# Patient Record
Sex: FEMALE | Race: WHITE | NOT HISPANIC OR LATINO | Employment: OTHER | URBAN - METROPOLITAN AREA
[De-identification: names, ages, dates, MRNs, and addresses within clinical notes are randomized per-mention and may not be internally consistent; named-entity substitution may affect disease eponyms.]

---
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
## 2018-02-08 ENCOUNTER — TELEPHONE (OUTPATIENT)
Dept: INTERNAL MEDICINE | Facility: CLINIC | Age: 18
End: 2018-02-08

## 2018-02-08 NOTE — TELEPHONE ENCOUNTER
----- Message from Amanda Tam sent at 2/8/2018 12:01 PM EST -----  Pt went to urgent care in [place] and they told her she had the flu last week on the 3rd, now she went to another place this morning and they told her she had a sinus infection, but pt has sores in her mouth, hard to talk, unable to eat, hasn't used bathroom in a few days.   Can we work pt in to evaluate her, or what would you suggest pt do?   Father--Quinn Singh   P. [phone]

## 2018-02-08 NOTE — TELEPHONE ENCOUNTER
We are completely booked today and patient will have to make a follow-up appointment or proceed to the urgent treatment Center to be evaluated.

## 2018-02-09 ENCOUNTER — OFFICE VISIT (OUTPATIENT)
Dept: INTERNAL MEDICINE | Facility: CLINIC | Age: 18
End: 2018-02-09

## 2018-02-09 VITALS
HEART RATE: 96 BPM | SYSTOLIC BLOOD PRESSURE: 90 MMHG | BODY MASS INDEX: 25.56 KG/M2 | TEMPERATURE: 97.2 F | RESPIRATION RATE: 20 BRPM | WEIGHT: 135.25 LBS | DIASTOLIC BLOOD PRESSURE: 62 MMHG

## 2018-02-09 DIAGNOSIS — B34.9 VIRAL SYNDROME: ICD-10-CM

## 2018-02-09 DIAGNOSIS — B08.5 HERPANGINA: Primary | ICD-10-CM

## 2018-02-09 LAB
ALBUMIN SERPL-MCNC: 4.6 G/DL (ref 3.2–4.8)
ALBUMIN/GLOB SERPL: 1.8 G/DL (ref 1.5–2.5)
ALP SERPL-CCNC: 77 U/L (ref 25–100)
ALT SERPL W P-5'-P-CCNC: 10 U/L (ref 7–40)
AMYLASE SERPL-CCNC: 36 U/L (ref 30–118)
ANION GAP SERPL CALCULATED.3IONS-SCNC: 11 MMOL/L (ref 3–11)
AST SERPL-CCNC: 13 U/L (ref 0–33)
BILIRUB SERPL-MCNC: 0.7 MG/DL (ref 0.3–1.2)
BUN BLD-MCNC: 12 MG/DL (ref 9–23)
BUN/CREAT SERPL: 17.1 (ref 7–25)
CALCIUM SPEC-SCNC: 9.3 MG/DL (ref 8.7–10.4)
CHLORIDE SERPL-SCNC: 102 MMOL/L (ref 99–109)
CO2 SERPL-SCNC: 27 MMOL/L (ref 20–31)
CREAT BLD-MCNC: 0.7 MG/DL (ref 0.6–1.3)
DEPRECATED RDW RBC AUTO: 36.3 FL (ref 37–54)
ERYTHROCYTE [DISTWIDTH] IN BLOOD BY AUTOMATED COUNT: 11.5 % (ref 11.3–14.5)
GFR SERPL CREATININE-BSD FRML MDRD: 108 ML/MIN/1.73
GLOBULIN UR ELPH-MCNC: 2.6 GM/DL
GLUCOSE BLD-MCNC: 87 MG/DL (ref 70–100)
HCT VFR BLD AUTO: 43.8 % (ref 34.5–44)
HGB BLD-MCNC: 15 G/DL (ref 11.5–15.5)
LIPASE SERPL-CCNC: 33 U/L (ref 6–51)
MCH RBC QN AUTO: 29.5 PG (ref 27–31)
MCHC RBC AUTO-ENTMCNC: 34.2 G/DL (ref 32–36)
MCV RBC AUTO: 86.1 FL (ref 80–99)
PLATELET # BLD AUTO: 235 10*3/MM3 (ref 150–450)
PMV BLD AUTO: 10.3 FL (ref 6–12)
POTASSIUM BLD-SCNC: 4.1 MMOL/L (ref 3.5–5.5)
PROT SERPL-MCNC: 7.2 G/DL (ref 5.7–8.2)
RBC # BLD AUTO: 5.09 10*6/MM3 (ref 3.89–5.14)
SODIUM BLD-SCNC: 140 MMOL/L (ref 132–146)
WBC NRBC COR # BLD: 6.08 10*3/MM3 (ref 4.5–13.5)

## 2018-02-09 PROCEDURE — 80053 COMPREHEN METABOLIC PANEL: CPT | Performed by: INTERNAL MEDICINE

## 2018-02-09 PROCEDURE — 83690 ASSAY OF LIPASE: CPT | Performed by: INTERNAL MEDICINE

## 2018-02-09 PROCEDURE — 36415 COLL VENOUS BLD VENIPUNCTURE: CPT | Performed by: INTERNAL MEDICINE

## 2018-02-09 PROCEDURE — 99214 OFFICE O/P EST MOD 30 MIN: CPT | Performed by: INTERNAL MEDICINE

## 2018-02-09 PROCEDURE — 85027 COMPLETE CBC AUTOMATED: CPT | Performed by: INTERNAL MEDICINE

## 2018-02-09 PROCEDURE — 82150 ASSAY OF AMYLASE: CPT | Performed by: INTERNAL MEDICINE

## 2018-02-09 RX ORDER — ACYCLOVIR 800 MG/1
800 TABLET ORAL 3 TIMES DAILY
Qty: 24 TABLET | Refills: 0 | OUTPATIENT
Start: 2018-02-09 | End: 2019-01-29

## 2018-02-09 RX ORDER — ACETAMINOPHEN 325 MG/1
650 TABLET ORAL EVERY 6 HOURS PRN
COMMUNITY
End: 2019-01-29

## 2018-02-09 NOTE — PROGRESS NOTES
Subjective   Gloria Pina is a 17 y.o. female.     History of Present Illness     Patient recently had been diagnosed with influenza about a 1 week ago. She now has developed fatigue, fever, sore throat with blisters in the mouth. Nausea and vomiting, no diarrhea.  Patient recently has developed oral ulcers and sores in the mouth along with her other symptoms.  She also has been biting her thumb and has developed a blister ulcer on the thumb as well.        Review of Systems   All other systems reviewed and are negative.      Objective   Physical Exam   Constitutional: She is oriented to person, place, and time. She appears well-developed and well-nourished.   HENT:   Head: Normocephalic.   Right Ear: External ear normal.   Left Ear: External ear normal.   Nose: Nose normal.   Mouth/Throat: Oropharynx is clear and moist.   Aphthous ulcers on the lip, tongue, inner mucous of mouth consistent with herpangina   Eyes: Conjunctivae and EOM are normal. Pupils are equal, round, and reactive to light.   Neck: Normal range of motion. Neck supple.   Cardiovascular: Normal rate, regular rhythm and normal heart sounds.    Pulmonary/Chest: Effort normal and breath sounds normal.   Abdominal: Soft. Bowel sounds are normal.   Musculoskeletal: Normal range of motion.   Neurological: She is alert and oriented to person, place, and time. She has normal reflexes.   Skin: Skin is warm.   Nursing note and vitals reviewed.      Assessment/Plan   Gloria was seen today for blister, oral swelling and hand pain.    Diagnoses and all orders for this visit:    Herpangina  -     acyclovir (ZOVIRAX) 800 MG tablet; Take 1 tablet by mouth 3 (Three) Times a Day. For 8 days treatment    Viral syndrome  -     Comprehensive Metabolic Panel  -     CBC (No Diff)  -     Amylase; Future  -     Lipase; Future

## 2018-02-12 ENCOUNTER — OFFICE VISIT (OUTPATIENT)
Dept: INTERNAL MEDICINE | Facility: CLINIC | Age: 18
End: 2018-02-12

## 2018-02-12 VITALS
DIASTOLIC BLOOD PRESSURE: 76 MMHG | BODY MASS INDEX: 25.51 KG/M2 | HEART RATE: 94 BPM | WEIGHT: 135 LBS | RESPIRATION RATE: 16 BRPM | SYSTOLIC BLOOD PRESSURE: 120 MMHG | TEMPERATURE: 98.4 F

## 2018-02-12 DIAGNOSIS — Z30.09 BIRTH CONTROL COUNSELING: ICD-10-CM

## 2018-02-12 DIAGNOSIS — B08.5 HERPANGINA: Primary | ICD-10-CM

## 2018-02-12 PROCEDURE — 99213 OFFICE O/P EST LOW 20 MIN: CPT | Performed by: INTERNAL MEDICINE

## 2018-02-12 NOTE — PROGRESS NOTES
Subjective   Gloria Pina is a 17 y.o. female.     History of Present Illness       1 oral herpangina -definitely has improved since being on the acyclovir.  Patient says that her oral sores have improved and are completely resolved.    2 birth controlled-patient would like to review other birth control options.  Taking oral contraceptives/pills do not seem to be the best option as she says that she is forgetful in taking her pills.        Review of Systems   All other systems reviewed and are negative.      Objective   Physical Exam   Constitutional: She is oriented to person, place, and time. She appears well-developed and well-nourished.   HENT:   Head: Normocephalic.   Right Ear: External ear normal.   Left Ear: External ear normal.   Nose: Nose normal.   Mouth/Throat: Oropharynx is clear and moist.   Eyes: Conjunctivae and EOM are normal. Pupils are equal, round, and reactive to light.   Neck: Normal range of motion. Neck supple.   Cardiovascular: Normal rate, regular rhythm and normal heart sounds.    Pulmonary/Chest: Effort normal and breath sounds normal.   Abdominal: Soft. Bowel sounds are normal.   Musculoskeletal: Normal range of motion.   Neurological: She is alert and oriented to person, place, and time. She has normal reflexes.   Nursing note and vitals reviewed.      Assessment/Plan   Gloria was seen today for follow-up.    Diagnoses and all orders for this visit:    Herpangina-clinically resolved    Birth control counseling  -     Ambulatory Referral to Gynecology  Referral for considerations for possible implant contraceptive.

## 2018-02-14 ENCOUNTER — TELEPHONE (OUTPATIENT)
Dept: OBSTETRICS AND GYNECOLOGY | Facility: CLINIC | Age: 18
End: 2018-02-14

## 2018-03-06 RX ORDER — LEVOCETIRIZINE DIHYDROCHLORIDE 5 MG/1
TABLET, FILM COATED ORAL
Qty: 30 TABLET | Refills: 5 | Status: SHIPPED | OUTPATIENT
Start: 2018-03-06 | End: 2019-03-21 | Stop reason: SDUPTHER

## 2018-04-11 ENCOUNTER — OFFICE VISIT (OUTPATIENT)
Dept: OBSTETRICS AND GYNECOLOGY | Facility: CLINIC | Age: 18
End: 2018-04-11

## 2018-04-11 VITALS
RESPIRATION RATE: 14 BRPM | SYSTOLIC BLOOD PRESSURE: 104 MMHG | DIASTOLIC BLOOD PRESSURE: 70 MMHG | WEIGHT: 138 LBS | BODY MASS INDEX: 26.06 KG/M2 | HEIGHT: 61 IN

## 2018-04-11 DIAGNOSIS — Z30.017 ENCOUNTER FOR INITIAL PRESCRIPTION OF IMPLANTABLE SUBDERMAL CONTRACEPTIVE: Primary | ICD-10-CM

## 2018-04-11 PROCEDURE — 11981 INSERTION DRUG DLVR IMPLANT: CPT | Performed by: NURSE PRACTITIONER

## 2018-04-11 NOTE — PROGRESS NOTES
NEXPLANON INSERTION    Gloria Pina  2000  [id number]    Patient's last menstrual period was 04/11/2018 (exact date).   She is currently on her period.   Pt referred to us by her PCP for Nexplanon insertion. She desires to continue her routine well woman care here as well. She has not been sexually active in several months and last pelvic exam was in 11/2017. Repeat annual will be due in 11/2018. Patient has been on OCPs x many years and desires long-term reversible contraception with implant.   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    Date of procedure:  4/11/2018    Risks and benefits discussed? yes  All questions answered? yes  Consents given by the patient  Written consent obtained? yes    Local anesthesia used:  yes - 4 cc's of  Meds; anesthesia local: 1% lidocaine    Procedure documentation:    The upper left arm (non-dominant) was marked at the intended site of insertion.  Betadine was used to cleanse the skin.  Local anesthesia was injected.  The Nexplanon was placed subdermally without difficulty.  The devise was able to be palpated in the arm by both myself and Gloria.  Steri-strips were then placed across the site of insertion and the arm was wrapped.    She tolerated the procedure well.  There were no complications.  EBL was minimal.    Post procedure instructions: Remove the wrapping in 24 hours and the steri-strips in 5 days. Wallet card given and patient is understanding that device will need to be removed or replaced in 3 years.    Follow up needed: Annual exam due in 11/2018 or PRN    This note was electronically signed.    Kalee Dhaliwal, GURJIT  04/11/18  3:11 PM

## 2018-08-03 ENCOUNTER — APPOINTMENT (OUTPATIENT)
Dept: CT IMAGING | Facility: HOSPITAL | Age: 18
End: 2018-08-03

## 2018-08-03 ENCOUNTER — HOSPITAL ENCOUNTER (EMERGENCY)
Facility: HOSPITAL | Age: 18
Discharge: HOME | End: 2018-08-03
Attending: EMERGENCY MEDICINE | Admitting: EMERGENCY MEDICINE

## 2018-08-03 VITALS
WEIGHT: 129 LBS | BODY MASS INDEX: 23.74 KG/M2 | RESPIRATION RATE: 18 BRPM | DIASTOLIC BLOOD PRESSURE: 63 MMHG | HEIGHT: 62 IN | OXYGEN SATURATION: 97 % | HEART RATE: 93 BPM | TEMPERATURE: 98 F | SYSTOLIC BLOOD PRESSURE: 117 MMHG

## 2018-08-03 DIAGNOSIS — N83.202 CYST OF LEFT OVARY: ICD-10-CM

## 2018-08-03 DIAGNOSIS — R10.2 PELVIC PAIN: Primary | ICD-10-CM

## 2018-08-03 LAB
ALBUMIN SERPL-MCNC: 4.2 G/DL (ref 3.5–5)
ALBUMIN/GLOB SERPL: 1.6 G/DL (ref 1–2)
ALP SERPL-CCNC: 59 U/L (ref 38–126)
ALT SERPL W P-5'-P-CCNC: 25 U/L (ref 13–69)
AMORPH URATE CRY URNS QL MICRO: ABNORMAL /HPF
ANION GAP SERPL CALCULATED.3IONS-SCNC: 13.1 MMOL/L (ref 10–20)
AST SERPL-CCNC: 18 U/L (ref 15–46)
B-HCG UR QL: NEGATIVE
BACTERIA UR QL AUTO: ABNORMAL /HPF
BASOPHILS # BLD AUTO: 0.03 10*3/MM3 (ref 0–0.2)
BASOPHILS NFR BLD AUTO: 0.5 % (ref 0–2.5)
BILIRUB SERPL-MCNC: 0.6 MG/DL (ref 0.2–1.3)
BILIRUB UR QL STRIP: NEGATIVE
BUN BLD-MCNC: 10 MG/DL (ref 7–20)
BUN/CREAT SERPL: 14.3 (ref 7.1–23.5)
CALCIUM SPEC-SCNC: 9.3 MG/DL (ref 8.4–10.2)
CHLORIDE SERPL-SCNC: 105 MMOL/L (ref 98–107)
CLARITY UR: ABNORMAL
CO2 SERPL-SCNC: 27 MMOL/L (ref 26–30)
COLOR UR: YELLOW
CREAT BLD-MCNC: 0.7 MG/DL (ref 0.6–1.3)
DEPRECATED RDW RBC AUTO: 36.5 FL (ref 37–54)
EOSINOPHIL # BLD AUTO: 0.19 10*3/MM3 (ref 0–0.7)
EOSINOPHIL NFR BLD AUTO: 3 % (ref 0–7)
ERYTHROCYTE [DISTWIDTH] IN BLOOD BY AUTOMATED COUNT: 11.8 % (ref 11.5–14.5)
GFR SERPL CREATININE-BSD FRML MDRD: 107 ML/MIN/1.73
GLOBULIN UR ELPH-MCNC: 2.6 GM/DL
GLUCOSE BLD-MCNC: 96 MG/DL (ref 74–98)
GLUCOSE UR STRIP-MCNC: NEGATIVE MG/DL
HCT VFR BLD AUTO: 38.9 % (ref 37–47)
HGB BLD-MCNC: 13.5 G/DL (ref 12–16)
HGB UR QL STRIP.AUTO: ABNORMAL
HYALINE CASTS UR QL AUTO: ABNORMAL /LPF
IMM GRANULOCYTES # BLD: 0.02 10*3/MM3 (ref 0–0.06)
IMM GRANULOCYTES NFR BLD: 0.3 % (ref 0–0.6)
KETONES UR QL STRIP: NEGATIVE
LEUKOCYTE ESTERASE UR QL STRIP.AUTO: NEGATIVE
LYMPHOCYTES # BLD AUTO: 2.4 10*3/MM3 (ref 0.6–3.4)
LYMPHOCYTES NFR BLD AUTO: 37.4 % (ref 10–50)
MCH RBC QN AUTO: 29.7 PG (ref 27–31)
MCHC RBC AUTO-ENTMCNC: 34.7 G/DL (ref 30–37)
MCV RBC AUTO: 85.5 FL (ref 81–99)
MONOCYTES # BLD AUTO: 0.53 10*3/MM3 (ref 0–0.9)
MONOCYTES NFR BLD AUTO: 8.3 % (ref 0–12)
MUCOUS THREADS URNS QL MICRO: ABNORMAL /HPF
NEUTROPHILS # BLD AUTO: 3.24 10*3/MM3 (ref 2–6.9)
NEUTROPHILS NFR BLD AUTO: 50.5 % (ref 37–80)
NITRITE UR QL STRIP: NEGATIVE
NRBC BLD MANUAL-RTO: 0 /100 WBC (ref 0–0)
PH UR STRIP.AUTO: 6.5 [PH] (ref 5–8)
PLATELET # BLD AUTO: 252 10*3/MM3 (ref 130–400)
PMV BLD AUTO: 9.9 FL (ref 6–12)
POTASSIUM BLD-SCNC: 4.1 MMOL/L (ref 3.5–5.1)
PROT SERPL-MCNC: 6.8 G/DL (ref 6.3–8.2)
PROT UR QL STRIP: NEGATIVE
RBC # BLD AUTO: 4.55 10*6/MM3 (ref 4.2–5.4)
RBC # UR: ABNORMAL /HPF
REF LAB TEST METHOD: ABNORMAL
SODIUM BLD-SCNC: 141 MMOL/L (ref 137–145)
SP GR UR STRIP: 1.02 (ref 1–1.03)
SQUAMOUS #/AREA URNS HPF: ABNORMAL /HPF
UROBILINOGEN UR QL STRIP: ABNORMAL
WBC NRBC COR # BLD: 6.41 10*3/MM3 (ref 4.8–10.8)
WBC UR QL AUTO: ABNORMAL /HPF

## 2018-08-03 PROCEDURE — 99283 EMERGENCY DEPT VISIT LOW MDM: CPT

## 2018-08-03 PROCEDURE — 80053 COMPREHEN METABOLIC PANEL: CPT | Performed by: NURSE PRACTITIONER

## 2018-08-03 PROCEDURE — 25010000002 KETOROLAC TROMETHAMINE PER 15 MG: Performed by: NURSE PRACTITIONER

## 2018-08-03 PROCEDURE — 96374 THER/PROPH/DIAG INJ IV PUSH: CPT

## 2018-08-03 PROCEDURE — 85025 COMPLETE CBC W/AUTO DIFF WBC: CPT | Performed by: NURSE PRACTITIONER

## 2018-08-03 PROCEDURE — 81025 URINE PREGNANCY TEST: CPT | Performed by: NURSE PRACTITIONER

## 2018-08-03 PROCEDURE — 74176 CT ABD & PELVIS W/O CONTRAST: CPT

## 2018-08-03 PROCEDURE — 81001 URINALYSIS AUTO W/SCOPE: CPT | Performed by: NURSE PRACTITIONER

## 2018-08-03 RX ORDER — KETOROLAC TROMETHAMINE 30 MG/ML
30 INJECTION, SOLUTION INTRAMUSCULAR; INTRAVENOUS ONCE
Status: COMPLETED | OUTPATIENT
Start: 2018-08-03 | End: 2018-08-03

## 2018-08-03 RX ORDER — IBUPROFEN 800 MG/1
800 TABLET ORAL
Qty: 21 TABLET | Refills: 0 | OUTPATIENT
Start: 2018-08-03 | End: 2019-01-29

## 2018-08-03 RX ADMIN — KETOROLAC TROMETHAMINE 30 MG: 30 INJECTION, SOLUTION INTRAMUSCULAR at 11:01

## 2018-08-03 NOTE — ED PROVIDER NOTES
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  She went to the urgent treatment center couple days ago and states that they did a pelvic exam and told her she most likely had a ruptured ovarian cyst.  She has also been spotting, which she states her menstrual cycle was not due for 2 weeks.  She's had no vaginal discharge or drainage.  She has a Nexplanon and does not believe that she could be pregnant.  She denies fevers or chills.  She has had some nausea.  She has not had vomiting.  He was given hydrocodone by the [facility] and states that it has not really helped the pain so this morning she took Tylenol.  She denies dysuria.  Review of Systems   All other systems reviewed and are negative.      Past Medical History:   Diagnosis Date   • Allergic    • Anxiety    • Asthma    • Migraine    • Ovarian cyst        Allergies   Allergen Reactions   • Other Other (See Comments)     Pet dander (cats) eyes get dry and nose runs       Past Surgical History:   Procedure Laterality Date   • APPENDECTOMY  2006   • CHOLECYSTECTOMY  2013       Family History   Problem Relation Age of Onset   • Migraines Mother    • Anxiety disorder Mother    • Hypertension Maternal Grandfather    • Heart disease Maternal Grandfather        Social History     Social History   • Marital status: Single     Social History Main Topics   • Smoking status: Never Smoker   • Smokeless tobacco: Never Used   • Alcohol use No   • Drug use: No   • Sexual activity: Yes     Partners: Male     Birth control/ protection: Condom     Other Topics Concern   • Not on file           Objective   Physical Exam   Constitutional: She is oriented to person, place, and time. She appears well-developed and well-nourished.   HENT:   Head: Normocephalic and atraumatic.   Mouth/Throat: Oropharynx is clear and moist.   Eyes: Pupils are equal,  round, and reactive to light. Conjunctivae and EOM are normal.   Neck: Normal range of motion. Neck supple.   Cardiovascular: Normal rate, regular rhythm, normal heart sounds and intact distal pulses.  Exam reveals no gallop and no friction rub.    No murmur heard.  Pulmonary/Chest: Effort normal and breath sounds normal.   Abdominal: Soft. Bowel sounds are normal. There is tenderness. There is guarding.   Mild guarding and rebound tenderness in the right lower quadrant in the suprapubic area.  Mild right-sided CVA tenderness.   Musculoskeletal: Normal range of motion.   Neurological: She is alert and oriented to person, place, and time.   Skin: Skin is warm and dry. Capillary refill takes less than 2 seconds.   Psychiatric: She has a normal mood and affect. Her behavior is normal. Judgment and thought content normal.   Nursing note and vitals reviewed.      Procedures           ED Course  ED Course as of Aug 03 1558   Fri Aug 03, 2018   1557 Labs are unremarkable.  Urine demonstrates a moderate amount of blood.  No evidence of infection.  CT demonstrated a left ovarian cyst.  Her is no evidence of a kidney stone.  Patient states that the Toradol did help.  I will discharge her with Motrin 800 every 8 hours with food for a week.  If her symptoms do not improve she will return.  She does indicate she has an appointment with her OB/GYN on Wednesday.  [CM]      ED Course User Index  [CM] Zuleyka Hughes APRN          I will get labs, urinalysis, urine pregnancy, CT of the abdomen and pelvis with stone protocol and I will give her Toradol 30 mg IV.        MDM      Final diagnoses:   Pelvic pain   Cyst of left ovary            Zuleyka Hughes APRN  08/03/18 [id number]

## 2018-08-06 ENCOUNTER — OFFICE VISIT (OUTPATIENT)
Dept: OBSTETRICS AND GYNECOLOGY | Facility: CLINIC | Age: 18
End: 2018-08-06

## 2018-08-06 VITALS
BODY MASS INDEX: 23.81 KG/M2 | RESPIRATION RATE: 14 BRPM | WEIGHT: 129.4 LBS | SYSTOLIC BLOOD PRESSURE: 98 MMHG | DIASTOLIC BLOOD PRESSURE: 58 MMHG | HEIGHT: 62 IN

## 2018-08-06 DIAGNOSIS — N83.202 CYST OF LEFT OVARY: Primary | ICD-10-CM

## 2018-08-06 PROCEDURE — 99213 OFFICE O/P EST LOW 20 MIN: CPT | Performed by: OBSTETRICS & GYNECOLOGY

## 2018-08-06 NOTE — PROGRESS NOTES
"Subjective   Chief Complaint   Patient presents with   • Follow-up     Seen in the ER over the weekend for ovarian cyst.     Gloria Pina is a 18 y.o. year old .  Patient's last menstrual period was 2018.  She presents to be seen for an ER follow-up.  Patient was seen for ruptured ovarian cyst that cause excruciating pain on the left side.  She stated that the pain began on Wednesday when the cyst initially ruptured.  The pain only got worse over the following few days and was intense enough to get her to the ER on Saturday.  CT scan performed showed a 2.7 cm ruptured cyst on the left ovary.  No follow-up ultrasound was performed.  She presents today for further management.  Notes the pain has improved but not resolved.  She is still taking hydrocodone as needed.    OTHER COMPLAINTS:  Nothing else    The following portions of the patient's history were reviewed and updated as appropriate:current medications, allergies, past family history, past medical history, past social history and past surgical history    Smoking status: Never Smoker                                                              Smokeless tobacco: Never Used                        Review of Systems  Constitutional POS: nothing reported    NEG: anorexia or night sweats   Gastointestinal POS: nothing reported    NEG: bloating, change in bowel habits, melena or reflux symptoms   Genitourinary POS: nothing reported    NEG: dysuria or hematuria   Integument POS: nothing reported    NEG: moles that are changing in size, shape, color or rashes   Breast POS: nothing reported    NEG: persistent breast lump, skin dimpling or nipple discharge         Objective   BP 98/58   Resp 14   Ht 157.5 cm (62\")   Wt 58.7 kg (129 lb 6.4 oz)   LMP 2018   Breastfeeding? No   BMI 23.67 kg/m²     General:  well developed; well nourished  no acute distress   Skin:  No suspicious lesions seen   Thyroid: normal to inspection and palpation   Lungs:  " breathing is unlabored   Heart:  regular rate and rhythm, S1, S2 normal, no murmur, click, rub or gallop   Breasts:  Not performed.   Abdomen: soft, non-tender; no masses  no umbilical or inginual hernias are present  no hepato-splenomegaly  Abnormal findings: moderate tenderness in the RLQ and in the LLQ   Pelvis: Clinical staff was present for exam     Lab Review   No data reviewed    Imaging   CT of abdomen/pelvis report        Assessment   1. Left ovarian cyst     Plan   1. Patient to continue pain meds as needed and we'll get ultrasound this week for baseline measurements of the ovarian cyst.      No orders of the defined types were placed in this encounter.         This note was electronically signed.    Hong Tucker M.D. [facility]

## 2018-08-08 ENCOUNTER — OFFICE VISIT (OUTPATIENT)
Dept: OBSTETRICS AND GYNECOLOGY | Facility: CLINIC | Age: 18
End: 2018-08-08

## 2018-08-08 VITALS
SYSTOLIC BLOOD PRESSURE: 102 MMHG | BODY MASS INDEX: 23.89 KG/M2 | HEIGHT: 62 IN | RESPIRATION RATE: 14 BRPM | DIASTOLIC BLOOD PRESSURE: 60 MMHG | WEIGHT: 129.8 LBS

## 2018-08-08 DIAGNOSIS — R10.2 CHRONIC PELVIC PAIN IN FEMALE: Primary | ICD-10-CM

## 2018-08-08 DIAGNOSIS — G89.29 CHRONIC PELVIC PAIN IN FEMALE: Primary | ICD-10-CM

## 2018-08-08 PROCEDURE — 99212 OFFICE O/P EST SF 10 MIN: CPT | Performed by: OBSTETRICS & GYNECOLOGY

## 2018-08-08 RX ORDER — HYDROCODONE BITARTRATE AND IBUPROFEN 7.5; 2 MG/1; MG/1
1 TABLET, FILM COATED ORAL EVERY 8 HOURS PRN
Qty: 30 TABLET | Refills: 0 | OUTPATIENT
Start: 2018-08-08 | End: 2019-01-29

## 2018-08-16 ENCOUNTER — OUTSIDE FACILITY SERVICE (OUTPATIENT)
Dept: OBSTETRICS AND GYNECOLOGY | Facility: CLINIC | Age: 18
End: 2018-08-16

## 2018-08-16 PROCEDURE — 58662 LAPAROSCOPY EXCISE LESIONS: CPT | Performed by: OBSTETRICS & GYNECOLOGY

## 2018-08-30 ENCOUNTER — OFFICE VISIT (OUTPATIENT)
Dept: OBSTETRICS AND GYNECOLOGY | Facility: CLINIC | Age: 18
End: 2018-08-30

## 2018-08-30 VITALS
RESPIRATION RATE: 14 BRPM | DIASTOLIC BLOOD PRESSURE: 70 MMHG | HEIGHT: 62 IN | WEIGHT: 129.6 LBS | BODY MASS INDEX: 23.85 KG/M2 | SYSTOLIC BLOOD PRESSURE: 102 MMHG

## 2018-08-30 DIAGNOSIS — Z09 POSTOPERATIVE EXAMINATION: Primary | ICD-10-CM

## 2018-08-30 PROCEDURE — 99024 POSTOP FOLLOW-UP VISIT: CPT | Performed by: OBSTETRICS & GYNECOLOGY

## 2018-08-30 NOTE — PROGRESS NOTES
"Subjective   Chief Complaint   Patient presents with   • Post-op     No complaints     Gloria Pina is a 18 y.o. year old  presenting to be seen for her post-operative visit.  Currently she reports no problems with eating, bowel movements, voiding, or wound drainage and pain is well controlled.    There was no pathology result associated with Gloria's recent procedure.      OTHER COMPLAINTS:  Nothing else    The following portions of the patient's history were reviewed and updated as appropriate:current medications and allergies       Objective   /70   Resp 14   Ht 157.5 cm (62\")   Wt 58.8 kg (129 lb 9.6 oz)   LMP 2018 (Approximate)   Breastfeeding? No   BMI 23.70 kg/m²     General:  well developed; well nourished  no acute distress  appears stated age   Abdomen: soft, non-tender; no masses  no umbilical or inginual hernias are present  no hepato-splenomegaly  incision is healed   Pelvis: Clinical staff was present for exam          Assessment   1. S/P laparoscopy  2. Chronic constipation     Plan   1. May return to full activity with no restrictions  2. Follow up for annual exam 11 weeks   3. Linzess samples given. Will call for prescription    No orders of the defined types were placed in this encounter.         This note was electronically signed.    Hong Tucker M.D. [facility]  2018  "

## 2018-09-06 NOTE — PROGRESS NOTES
"Subjective   Chief Complaint   Patient presents with   • Follow-up     Had ultrasound for ovarian cyst     Gloria Pina is a 18 y.o. year old .  Patient's last menstrual period was 2018.  She presents to be seen for ultrasound follow-up for chronic pelvic pain and a left ovarian cyst.  Patient states that she is without acute complaint.    The following portions of the patient's history were reviewed and updated as appropriate:current medications and allergies    Smoking status: Never Smoker                                                              Smokeless tobacco: Never Used                        Review of Systems  Constitutional POS: nothing reported    NEG: anorexia or night sweats   Gastointestinal POS: nothing reported    NEG: bloating, change in bowel habits, melena or reflux symptoms   Genitourinary POS: nothing reported    NEG: dysuria or hematuria   Integument POS: nothing reported    NEG: moles that are changing in size, shape, color or rashes   Breast POS: nothing reported    NEG: persistent breast lump, skin dimpling or nipple discharge        Objective   /60   Resp 14   Ht 157.5 cm (62\")   Wt 58.9 kg (129 lb 12.8 oz)   LMP 2018   Breastfeeding? No   BMI 23.74 kg/m²     Lab Review   No data reviewed    Imaging   Pelvic ultrasound report        Assessment   1. Chronic pelvic pain  2. Left ovarian cyst     Plan   1. Discussed findings on ultrasound today with patient's chronic symptoms.  Options for treatment with discussed.  Patient opts for diagnostic laparoscopy with possible left ovarian cystectomy.  We will schedule.      New Medications Ordered This Visit   Medications   • HYDROcodone-ibuprofen (VICOPROFEN) 7.5-200 MG per tablet     Sig: Take 1 tablet by mouth Every 8 (Eight) Hours As Needed for Moderate Pain .     Dispense:  30 tablet     Refill:  0          This note was electronically signed.    Hong Tucker M.D., YVES      Total face-to-face time spent " today with Gloria  was 20 minutes (level 3).  20 minutes of the time was spent coordinating care, answering her questions and counseling regarding pathophysiology of her presenting problem along with plans for any diagnositc work-up and treatment.

## 2018-10-16 ENCOUNTER — HOSPITAL ENCOUNTER (EMERGENCY)
Facility: HOSPITAL | Age: 18
Discharge: HOME | End: 2018-10-16
Attending: EMERGENCY MEDICINE | Admitting: EMERGENCY MEDICINE

## 2018-10-16 VITALS
BODY MASS INDEX: 25.19 KG/M2 | WEIGHT: 133.4 LBS | SYSTOLIC BLOOD PRESSURE: 118 MMHG | HEART RATE: 88 BPM | RESPIRATION RATE: 18 BRPM | HEIGHT: 61 IN | TEMPERATURE: 98 F | OXYGEN SATURATION: 100 % | DIASTOLIC BLOOD PRESSURE: 70 MMHG

## 2018-10-16 DIAGNOSIS — G43.109 MIGRAINE WITH AURA AND WITHOUT STATUS MIGRAINOSUS, NOT INTRACTABLE: Primary | ICD-10-CM

## 2018-10-16 PROCEDURE — 96374 THER/PROPH/DIAG INJ IV PUSH: CPT

## 2018-10-16 PROCEDURE — 25010000002 PROCHLORPERAZINE EDISYLATE PER 10 MG: Performed by: EMERGENCY MEDICINE

## 2018-10-16 PROCEDURE — 99283 EMERGENCY DEPT VISIT LOW MDM: CPT

## 2018-10-16 PROCEDURE — 96361 HYDRATE IV INFUSION ADD-ON: CPT

## 2018-10-16 PROCEDURE — 25010000002 KETOROLAC TROMETHAMINE PER 15 MG: Performed by: EMERGENCY MEDICINE

## 2018-10-16 PROCEDURE — 96375 TX/PRO/DX INJ NEW DRUG ADDON: CPT

## 2018-10-16 RX ORDER — DIPHENHYDRAMINE HYDROCHLORIDE 50 MG/ML
25 INJECTION INTRAMUSCULAR; INTRAVENOUS ONCE
Status: DISCONTINUED | OUTPATIENT
Start: 2018-10-16 | End: 2018-10-16 | Stop reason: HOSPADM

## 2018-10-16 RX ORDER — KETOROLAC TROMETHAMINE 30 MG/ML
10 INJECTION, SOLUTION INTRAMUSCULAR; INTRAVENOUS ONCE
Status: COMPLETED | OUTPATIENT
Start: 2018-10-16 | End: 2018-10-16

## 2018-10-16 RX ADMIN — PROCHLORPERAZINE EDISYLATE 10 MG: 5 INJECTION INTRAMUSCULAR; INTRAVENOUS at 01:46

## 2018-10-16 RX ADMIN — KETOROLAC TROMETHAMINE 10 MG: 30 INJECTION, SOLUTION INTRAMUSCULAR at 01:45

## 2018-10-16 RX ADMIN — SODIUM CHLORIDE 1000 ML: 9 INJECTION, SOLUTION INTRAVENOUS at 01:45

## 2018-10-16 NOTE — ED PROVIDER NOTES
TRIAGE CHIEF COMPLAINT:     Nursing and triage notes reviewed    Chief Complaint   Patient presents with   • Headache      HPI: Gloria Pina is a 18 y.o. female who presents to the emergency department complaining of a migraine headache.  Patient has a history of migraine headaches.  Symptoms have been present for the past several hours.  Patient states she had her typical aura with some floaters in her eyes and then developed a gradual in onset sided headache.  Patient has light sensitivity.  Tried Maxalt with no improvement in symptoms.     REVIEW OF SYSTEMS: All other systems reviewed and are negative     PAST MEDICAL HISTORY:   Past Medical History:   Diagnosis Date   • Allergic    • Anxiety    • Asthma    • Migraine    • Ovarian cyst         FAMILY HISTORY:   Family History   Problem Relation Age of Onset   • Migraines Mother    • Anxiety disorder Mother    • Hypertension Maternal Grandfather    • Heart disease Maternal Grandfather         SOCIAL HISTORY:   Social History     Social History   • Marital status: Single     Spouse name: N/A   • Number of children: N/A   • Years of education: N/A     Occupational History   • Not on file.     Social History Main Topics   • Smoking status: Never Smoker   • Smokeless tobacco: Never Used   • Alcohol use No   • Drug use: No   • Sexual activity: Yes     Partners: Male     Birth control/ protection: Condom     Other Topics Concern   • Not on file     Social History Narrative   • No narrative on file        SURGICAL HISTORY:   Past Surgical History:   Procedure Laterality Date   • APPENDECTOMY  2006   • CHOLECYSTECTOMY  2013   • LAPAROSCOPIC OVARIAN CYSTECTOMY Left 08/16/2018        CURRENT MEDICATIONS:      Medication List      ASK your doctor about these medications    acetaminophen 325 MG tablet  Commonly known as:  TYLENOL     acyclovir 800 MG tablet  Commonly known as:  ZOVIRAX  Take 1 tablet by mouth 3 (Three) Times a Day. For 8 days treatment     escitalopram 10 MG  tablet  Commonly known as:  LEXAPRO  Take 1 tablet by mouth Daily.     HYDROcodone-ibuprofen 7.5-200 MG per tablet  Commonly known as:  VICOPROFEN  Take 1 tablet by mouth Every 8 (Eight) Hours As Needed for Moderate Pain .     ibuprofen 800 MG tablet  Commonly known as:  ADVIL,MOTRIN  Take 1 tablet by mouth 3 (Three) Times a Day With Meals.     levocetirizine 5 MG tablet  Commonly known as:  XYZAL  TAKE 1 TABLET BY MOUTH EVERY EVENING.     nortriptyline 50 MG capsule  Commonly known as:  PAMELOR  Take one tablet po qhs     ondansetron 4 MG tablet  Commonly known as:  ZOFRAN  Take 1 tablet by mouth Every 8 (Eight) Hours As Needed for Nausea or   Vomiting.     rizatriptan 10 MG tablet  Commonly known as:  MAXALT  Take 1 tablet by mouth As Needed for Migraine (take 1 at onset of   migraine. Repeat every 2 hrs prn. Max. of 3 in 24 hours) for up to 1 dose.           ALLERGIES: Other     PHYSICAL EXAM:   VITAL SIGNS:   Vitals:    10/16/18 0041   BP: 112/65   Pulse: 94   Resp: 16   Temp: 98.1 °F (36.7 °C)   SpO2: 100%      CONSTITUTIONAL: Awake, oriented, appears non-toxic   HENT: Atraumatic, normocephalic, oral mucosa pink and moist, airway patent.  EYES: Conjunctiva clear, pupils equal round and reactive to light   NECK: Trachea midline, non-tender, supple   CARDIOVASCULAR: Normal heart rate, Normal rhythm, No murmurs, rubs, gallops   PULMONARY/CHEST: Clear to auscultation, no rhonchi, wheezes, or rales. Symmetrical breath sounds. Non-tender.   ABDOMINAL: Non-distended, soft, non-tender - no rebound or guarding.  NEUROLOGIC: Non-focal, moving all four extremities, no gross sensory or motor deficits.   EXTREMITIES: No clubbing, cyanosis, or edema   SKIN: Warm, Dry, No erythema, No rash     ED COURSE / MEDICAL DECISION MAKING:   Gloria Pina is a 18 y.o. female who presents to the emergency department for evaluation of a migraine headache.  Patient's historical symptoms consistent with her typical migraine headache.  I  don't feel labs or imaging are indicated.  Patient treated with Compazine and Toradol with resolution of headache.  We'll discharge home with return precautions.    DECISION TO DISCHARGE/ADMIT: see ED care timeline     FINAL IMPRESSION:   1 -- migraine headache   2 --   3 --     Electronically signed by: Renita Chisholm MD, 10/16/2018 1:58 AM       Renita Chisholm MD  10/16/18 0241

## 2018-12-07 ENCOUNTER — TELEPHONE (OUTPATIENT)
Dept: OBSTETRICS AND GYNECOLOGY | Facility: CLINIC | Age: 18
End: 2018-12-07

## 2019-01-07 NOTE — TELEPHONE ENCOUNTER
----- Message from Ara Mcdaniel sent at 1/7/2019 11:58 AM EST -----  [phone]  REFILL ON NEBULIZER AND ALBUTEROL EMERGENCY INHALER   [facility]

## 2019-01-07 NOTE — TELEPHONE ENCOUNTER
S/w pt who is requesting a Nebulizer inhaler.Pt states she was seen at [facility] in [place] and was Dx with Bronchitis. She told the physician there she had an inahler but when she returned home, she realized the inhaler was .      After looking at both current and previous medications, I was unable to locate an inhaler Rx by Dr. Schmid in the past. Informed pt she would need to be seen to receive Rx for inhaler.     I also advised the pt to contact the [facility] in [place] to Rx an inhaler from her previous appointment.

## 2019-03-19 ENCOUNTER — HOSPITAL ENCOUNTER (EMERGENCY)
Facility: HOSPITAL | Age: 19
Discharge: HOME | End: 2019-03-20
Attending: EMERGENCY MEDICINE | Admitting: EMERGENCY MEDICINE

## 2019-03-19 ENCOUNTER — APPOINTMENT (OUTPATIENT)
Dept: ULTRASOUND IMAGING | Facility: HOSPITAL | Age: 19
End: 2019-03-19

## 2019-03-19 DIAGNOSIS — R82.71 BACTERIURIA: ICD-10-CM

## 2019-03-19 DIAGNOSIS — O03.9 MISCARRIAGE: Primary | ICD-10-CM

## 2019-03-19 LAB
ABO GROUP BLD: NORMAL
AMORPH URATE CRY URNS QL MICRO: ABNORMAL /HPF
BACTERIA UR QL AUTO: ABNORMAL /HPF
BASOPHILS # BLD AUTO: 0.04 10*3/MM3 (ref 0–0.2)
BASOPHILS NFR BLD AUTO: 0.6 % (ref 0–2.5)
BILIRUB UR QL STRIP: NEGATIVE
CLARITY UR: ABNORMAL
COLOR UR: YELLOW
DEPRECATED RDW RBC AUTO: 37.3 FL (ref 37–54)
EOSINOPHIL # BLD AUTO: 0.27 10*3/MM3 (ref 0–0.7)
EOSINOPHIL NFR BLD AUTO: 4 % (ref 0–7)
ERYTHROCYTE [DISTWIDTH] IN BLOOD BY AUTOMATED COUNT: 11.7 % (ref 11.5–14.5)
GLUCOSE UR STRIP-MCNC: NEGATIVE MG/DL
HCT VFR BLD AUTO: 43.1 % (ref 37–47)
HGB BLD-MCNC: 15 G/DL (ref 12–16)
HGB UR QL STRIP.AUTO: ABNORMAL
HYALINE CASTS UR QL AUTO: ABNORMAL /LPF
IMM GRANULOCYTES # BLD AUTO: 0.03 10*3/MM3 (ref 0–0.06)
IMM GRANULOCYTES NFR BLD AUTO: 0.4 % (ref 0–0.6)
KETONES UR QL STRIP: NEGATIVE
LEUKOCYTE ESTERASE UR QL STRIP.AUTO: ABNORMAL
LYMPHOCYTES # BLD AUTO: 1.92 10*3/MM3 (ref 0.6–3.4)
LYMPHOCYTES NFR BLD AUTO: 28.6 % (ref 10–50)
MCH RBC QN AUTO: 30.5 PG (ref 27–31)
MCHC RBC AUTO-ENTMCNC: 34.8 G/DL (ref 30–37)
MCV RBC AUTO: 87.8 FL (ref 81–99)
MONOCYTES # BLD AUTO: 0.46 10*3/MM3 (ref 0–0.9)
MONOCYTES NFR BLD AUTO: 6.9 % (ref 0–12)
MUCOUS THREADS URNS QL MICRO: ABNORMAL /HPF
NEUTROPHILS # BLD AUTO: 3.99 10*3/MM3 (ref 2–6.9)
NEUTROPHILS NFR BLD AUTO: 59.5 % (ref 37–80)
NITRITE UR QL STRIP: NEGATIVE
NRBC BLD AUTO-RTO: 0 /100 WBC (ref 0–0)
PH UR STRIP.AUTO: 5.5 [PH] (ref 5–8)
PLATELET # BLD AUTO: 267 10*3/MM3 (ref 130–400)
PMV BLD AUTO: 9.9 FL (ref 6–12)
PROT UR QL STRIP: NEGATIVE
RBC # BLD AUTO: 4.91 10*6/MM3 (ref 4.2–5.4)
RBC # UR: ABNORMAL /HPF
REF LAB TEST METHOD: ABNORMAL
RH BLD: POSITIVE
SP GR UR STRIP: 1.01 (ref 1–1.03)
SQUAMOUS #/AREA URNS HPF: ABNORMAL /HPF
UROBILINOGEN UR QL STRIP: ABNORMAL
WBC NRBC COR # BLD: 6.71 10*3/MM3 (ref 4.8–10.8)
WBC UR QL AUTO: ABNORMAL /HPF

## 2019-03-19 PROCEDURE — 85025 COMPLETE CBC W/AUTO DIFF WBC: CPT | Performed by: PHYSICIAN ASSISTANT

## 2019-03-19 PROCEDURE — 84702 CHORIONIC GONADOTROPIN TEST: CPT | Performed by: PHYSICIAN ASSISTANT

## 2019-03-19 PROCEDURE — 86901 BLOOD TYPING SEROLOGIC RH(D): CPT | Performed by: PHYSICIAN ASSISTANT

## 2019-03-19 PROCEDURE — 36415 COLL VENOUS BLD VENIPUNCTURE: CPT

## 2019-03-19 PROCEDURE — 86900 BLOOD TYPING SEROLOGIC ABO: CPT | Performed by: PHYSICIAN ASSISTANT

## 2019-03-19 PROCEDURE — 76817 TRANSVAGINAL US OBSTETRIC: CPT

## 2019-03-19 PROCEDURE — 81001 URINALYSIS AUTO W/SCOPE: CPT | Performed by: PHYSICIAN ASSISTANT

## 2019-03-19 PROCEDURE — 99283 EMERGENCY DEPT VISIT LOW MDM: CPT

## 2019-03-20 VITALS
HEIGHT: 61 IN | BODY MASS INDEX: 25.45 KG/M2 | RESPIRATION RATE: 18 BRPM | OXYGEN SATURATION: 99 % | WEIGHT: 134.8 LBS | TEMPERATURE: 98.5 F | HEART RATE: 90 BPM | DIASTOLIC BLOOD PRESSURE: 74 MMHG | SYSTOLIC BLOOD PRESSURE: 118 MMHG

## 2019-03-20 LAB — HCG INTACT+B SERPL-ACNC: <2.39 MIU/ML

## 2019-03-20 RX ORDER — CEPHALEXIN 500 MG/1
500 CAPSULE ORAL 2 TIMES DAILY
Qty: 10 CAPSULE | Refills: 0 | Status: SHIPPED | OUTPATIENT
Start: 2019-03-20 | End: 2019-03-26

## 2019-03-20 NOTE — DISCHARGE INSTRUCTIONS
There is no evidence of intrauterine pregnancy on your ultrasound; vaginal bleeding and cramping likely miscarriage.Take home medications as prescribed. Take antibiotic as directed to treat bacteria in your urine. Call you OBGYN first thing in the morning for earliest available follow up. Follow up with your PCP as needed. Return to the ED for any change, worsening of symptoms, or any additional concerns including but not limited to severe abdominal pain with fever >100.4, intractable vomiting, dizziness, syncope.

## 2019-03-20 NOTE — ED PROVIDER NOTES
Subjective   Patient is a 18-year-old  female with history of anxiety, asthma, migraines, and ovarian cysts who is approximately 9 weeks gestation that presents to the ED for evaluation of vaginal bleeding.  Patient states she has been having issues with this pregnancy with vaginal bleeding.  She states she follows with Dr. Tucker at [facility].  She states she has had episodes and has had fluctuating hCG levels.  Her OB/GYN told her that this pregnancy would likely end in miscarriage.  She states that upon awakening from a nap at 9 PM this evening she had an episode of heavy vaginal bleeding with clots.  Patient says she has been having some lower abdominal cramping that is worse on her left side.  She states she has had dysuria recently and is been unable to tell she is had hematuria.  She denies any other abdominal pain, nausea, emesis or diarrhea, fever, chills chest pain, difficulty breathing, dizziness, syncope, or any other symptoms.            Review of Systems   All other systems reviewed and are negative.      Past Medical History:   Diagnosis Date   • Allergic    • Anxiety    • Asthma    • Migraine    • Ovarian cyst        Allergies   Allergen Reactions   • Other Other (See Comments)     Pet dander (cats) eyes get dry and nose runs       Past Surgical History:   Procedure Laterality Date   • APPENDECTOMY     • CHOLECYSTECTOMY     • LAPAROSCOPIC OVARIAN CYSTECTOMY Left 2018       Family History   Problem Relation Age of Onset   • Migraines Mother    • Anxiety disorder Mother    • Hypertension Maternal Grandfather    • Heart disease Maternal Grandfather        Social History     Socioeconomic History   • Marital status: Single     Spouse name: Not on file   • Number of children: Not on file   • Years of education: Not on file   • Highest education level: Not on file   Tobacco Use   • Smoking status: Never Smoker   • Smokeless tobacco: Never Used   Substance and Sexual  Activity   • Alcohol use: No   • Drug use: No   • Sexual activity: Yes     Partners: Male     Birth control/protection: Condom           Objective   Physical Exam   Nursing note and vitals reviewed.    GEN: No acute distress, sitting upright in a stretcher.  Awake, alert, speaking in full sentences.  She does not appear toxic.  Head: Normocephalic, atraumatic  Eyes: Pupils equal round reactive to light, EOM intact  ENT: Posterior pharynx normal in appearance, oral mucosa is moist, tongue midline.  Chest: Nontender to palpation  Cardiovascular: Regular rate and rhythm  Lungs: Clear to auscultation bilaterally  Abdomen: Nondistended.  Bowel sounds are present.  Soft, mildly tender to palpation in lower abdomen, worse in the left side.  No guarding or rebound.  Extremities: No edema, normal appearance, full ROM  Neuro: GCS 15  Psych: Mood and affect are appropriate    Procedures           ED Course  ED Course as of Mar 20 0136   Wed Mar 20, 2019   0048 Patient's hCG level is less than 2.39. Ultrasound revealed no significant endometrial thickening or intrauterine gestational sac. Blood type positive. They did see a follicle on her right ovary. Discussed these findings with patient. Advised she call her OBGYN first thing in the morning. We discussed strict return precautions and follow up. Will give short dose of antibiotics to treat symptomatic bacteriuria.   [LA]      ED Course User Index  [LA] Eve Chaparro PA-C                  [place]  Number of Diagnoses or Management Options  Bacteriuria:   Miscarriage:   Diagnosis management comments: On arrival, patient is afebrile, nontoxic in appearance, no acute distress differential diagnosis would include miscarriage, threatened miscarriage, molar pregnancy, UTI, ectopic pregnancy, dysfunctional uterine bleeding, polyp, or other concerns.  Vital signs are not concerning for life-threatening hemorrhage.  Will CBC, AB/RHo, beta quant.  Urine revealed mild bacteriuria and  leuk esterase with hematuria. H&H is stable at this time.  Beta HCG <2.9.  Ultrasound revealed no evidence of gestational sac or endometrial thickening. Pelvic exam deferred to gynecologist after discussion with patient.  Discussed findings with patient and she is likely already miscarried.  She was given a short dose of Keflex to help treat the bacteria in her urine. Recommend follow-up with her gynecologist at the first available appointment.  We discussed strict return precautions.  Patient verbalized understanding and was agreeable to this plan of care.  She was discharged home in stable condition.       Amount and/or Complexity of Data Reviewed  Clinical lab tests: reviewed and ordered  Tests in the radiology section of CPT®: reviewed and ordered    Risk of Complications, Morbidity, and/or Mortality  Presenting problems: moderate  Diagnostic procedures: moderate  Management options: moderate    Patient Progress  Patient progress: stable        Final diagnoses:   Miscarriage   Bacteriuria            Eve Chaparro PA-C  03/20/19 0136

## 2019-03-21 DIAGNOSIS — G43.001 MIGRAINE WITHOUT AURA AND WITH STATUS MIGRAINOSUS, NOT INTRACTABLE: ICD-10-CM

## 2019-03-21 DIAGNOSIS — F41.9 ANXIETY: ICD-10-CM

## 2019-03-21 NOTE — TELEPHONE ENCOUNTER
Last OV: 02/09/19  [facility] requested today  CSA and UDS none    Last filled on 08/14/17 #30 refills 4

## 2019-03-22 RX ORDER — NORTRIPTYLINE HYDROCHLORIDE 50 MG/1
CAPSULE ORAL
Qty: 90 CAPSULE | Refills: 2 | Status: SHIPPED | OUTPATIENT
Start: 2019-03-22 | End: 2019-06-16 | Stop reason: SDUPTHER

## 2019-03-22 RX ORDER — ESCITALOPRAM OXALATE 10 MG/1
10 TABLET ORAL DAILY
Qty: 90 TABLET | Refills: 2 | Status: SHIPPED | OUTPATIENT
Start: 2019-03-22 | End: 2019-06-16 | Stop reason: SDUPTHER

## 2019-03-22 RX ORDER — LEVOCETIRIZINE DIHYDROCHLORIDE 5 MG/1
5 TABLET, FILM COATED ORAL EVERY EVENING
Qty: 30 TABLET | Refills: 5 | Status: SHIPPED | OUTPATIENT
Start: 2019-03-22 | End: 2020-02-25

## 2019-03-26 ENCOUNTER — OFFICE VISIT (OUTPATIENT)
Dept: OBSTETRICS AND GYNECOLOGY | Facility: CLINIC | Age: 19
End: 2019-03-26

## 2019-03-26 VITALS
DIASTOLIC BLOOD PRESSURE: 50 MMHG | BODY MASS INDEX: 25.34 KG/M2 | HEIGHT: 61 IN | RESPIRATION RATE: 14 BRPM | WEIGHT: 134.2 LBS | SYSTOLIC BLOOD PRESSURE: 96 MMHG

## 2019-03-26 DIAGNOSIS — O03.9 SAB (SPONTANEOUS ABORTION): Primary | ICD-10-CM

## 2019-03-26 PROCEDURE — 99213 OFFICE O/P EST LOW 20 MIN: CPT | Performed by: OBSTETRICS & GYNECOLOGY

## 2019-06-16 DIAGNOSIS — G43.001 MIGRAINE WITHOUT AURA AND WITH STATUS MIGRAINOSUS, NOT INTRACTABLE: ICD-10-CM

## 2019-06-16 DIAGNOSIS — F41.9 ANXIETY: ICD-10-CM

## 2019-06-17 RX ORDER — NORTRIPTYLINE HYDROCHLORIDE 50 MG/1
CAPSULE ORAL
Qty: 30 CAPSULE | Refills: 0 | Status: SHIPPED | OUTPATIENT
Start: 2019-06-17 | End: 2019-07-26 | Stop reason: SDUPTHER

## 2019-06-17 RX ORDER — ESCITALOPRAM OXALATE 10 MG/1
TABLET ORAL
Qty: 30 TABLET | Refills: 0 | Status: SHIPPED | OUTPATIENT
Start: 2019-06-17 | End: 2019-07-26 | Stop reason: SDUPTHER

## 2019-07-17 ENCOUNTER — OFFICE VISIT (OUTPATIENT)
Dept: INTERNAL MEDICINE | Facility: CLINIC | Age: 19
End: 2019-07-17

## 2019-07-17 VITALS
WEIGHT: 129.25 LBS | HEART RATE: 101 BPM | BODY MASS INDEX: 23.64 KG/M2 | OXYGEN SATURATION: 98 % | TEMPERATURE: 98.6 F | DIASTOLIC BLOOD PRESSURE: 60 MMHG | SYSTOLIC BLOOD PRESSURE: 90 MMHG | RESPIRATION RATE: 20 BRPM

## 2019-07-17 DIAGNOSIS — R53.82 CHRONIC FATIGUE: Primary | ICD-10-CM

## 2019-07-17 DIAGNOSIS — J02.9 PHARYNGITIS, UNSPECIFIED ETIOLOGY: ICD-10-CM

## 2019-07-17 DIAGNOSIS — B34.9 ACUTE VIRAL SYNDROME: ICD-10-CM

## 2019-07-17 LAB
ALBUMIN SERPL-MCNC: 4.4 G/DL (ref 3.5–5.2)
ALBUMIN/GLOB SERPL: 1.8 G/DL
ALP SERPL-CCNC: 70 U/L (ref 39–117)
ALT SERPL W P-5'-P-CCNC: 10 U/L (ref 1–33)
ANION GAP SERPL CALCULATED.3IONS-SCNC: 11.8 MMOL/L (ref 5–15)
AST SERPL-CCNC: 14 U/L (ref 1–32)
BASOPHILS # BLD AUTO: 0.03 10*3/MM3 (ref 0–0.2)
BASOPHILS NFR BLD AUTO: 0.5 % (ref 0–1.5)
BILIRUB SERPL-MCNC: 0.7 MG/DL (ref 0.2–1.2)
BUN BLD-MCNC: 8 MG/DL (ref 6–20)
BUN/CREAT SERPL: 11.8 (ref 7–25)
CALCIUM SPEC-SCNC: 9.3 MG/DL (ref 8.6–10.5)
CHLORIDE SERPL-SCNC: 102 MMOL/L (ref 98–107)
CO2 SERPL-SCNC: 25.2 MMOL/L (ref 22–29)
CREAT BLD-MCNC: 0.68 MG/DL (ref 0.57–1)
DEPRECATED RDW RBC AUTO: 39.4 FL (ref 37–54)
EOSINOPHIL # BLD AUTO: 0.1 10*3/MM3 (ref 0–0.4)
EOSINOPHIL NFR BLD AUTO: 1.6 % (ref 0.3–6.2)
ERYTHROCYTE [DISTWIDTH] IN BLOOD BY AUTOMATED COUNT: 12 % (ref 12.3–15.4)
ERYTHROCYTE [SEDIMENTATION RATE] IN BLOOD: 5 MM/HR (ref 0–20)
EXPIRATION DATE: NORMAL
GFR SERPL CREATININE-BSD FRML MDRD: 110 ML/MIN/1.73
GLOBULIN UR ELPH-MCNC: 2.4 GM/DL
GLUCOSE BLD-MCNC: 84 MG/DL (ref 65–99)
HCT VFR BLD AUTO: 44.2 % (ref 34–46.6)
HETEROPH AB SER QL LA: NEGATIVE
HETEROPH AB SER QL LA: NEGATIVE
HGB BLD-MCNC: 14.3 G/DL (ref 12–15.9)
IMM GRANULOCYTES # BLD AUTO: 0.02 10*3/MM3 (ref 0–0.05)
IMM GRANULOCYTES NFR BLD AUTO: 0.3 % (ref 0–0.5)
INTERNAL CONTROL: NORMAL
LYMPHOCYTES # BLD AUTO: 1.76 10*3/MM3 (ref 0.7–3.1)
LYMPHOCYTES NFR BLD AUTO: 27.3 % (ref 19.6–45.3)
Lab: NORMAL
MCH RBC QN AUTO: 29.2 PG (ref 26.6–33)
MCHC RBC AUTO-ENTMCNC: 32.4 G/DL (ref 31.5–35.7)
MCV RBC AUTO: 90.4 FL (ref 79–97)
MONOCYTES # BLD AUTO: 0.47 10*3/MM3 (ref 0.1–0.9)
MONOCYTES NFR BLD AUTO: 7.3 % (ref 5–12)
NEUTROPHILS # BLD AUTO: 4.07 10*3/MM3 (ref 1.7–7)
NEUTROPHILS NFR BLD AUTO: 63 % (ref 42.7–76)
NRBC BLD AUTO-RTO: 0 /100 WBC (ref 0–0.2)
PLATELET # BLD AUTO: 264 10*3/MM3 (ref 140–450)
PMV BLD AUTO: 11.6 FL (ref 6–12)
POTASSIUM BLD-SCNC: 4.1 MMOL/L (ref 3.5–5.2)
PROT SERPL-MCNC: 6.8 G/DL (ref 6–8.5)
RBC # BLD AUTO: 4.89 10*6/MM3 (ref 3.77–5.28)
SODIUM BLD-SCNC: 139 MMOL/L (ref 136–145)
WBC NRBC COR # BLD: 6.45 10*3/MM3 (ref 3.4–10.8)

## 2019-07-17 PROCEDURE — 86308 HETEROPHILE ANTIBODY SCREEN: CPT | Performed by: INTERNAL MEDICINE

## 2019-07-17 PROCEDURE — 85025 COMPLETE CBC W/AUTO DIFF WBC: CPT | Performed by: INTERNAL MEDICINE

## 2019-07-17 PROCEDURE — 80053 COMPREHEN METABOLIC PANEL: CPT | Performed by: INTERNAL MEDICINE

## 2019-07-17 PROCEDURE — 99213 OFFICE O/P EST LOW 20 MIN: CPT | Performed by: INTERNAL MEDICINE

## 2019-07-17 PROCEDURE — 36415 COLL VENOUS BLD VENIPUNCTURE: CPT | Performed by: INTERNAL MEDICINE

## 2019-07-17 PROCEDURE — 85652 RBC SED RATE AUTOMATED: CPT | Performed by: INTERNAL MEDICINE

## 2019-07-17 NOTE — PROGRESS NOTES
"Subjective   Gloria Pina is a 18 y.o. female.     History of Present Illness     The following portions of the patient's history were reviewed and updated as appropriate: allergies, current medications, past family history, past medical history, past social history, past surgical history and problem list.    Fatigue x  2 weeks  Patient presented with a sore throat approximately 2 weeks ago and went to the urgent treatment center.  Patient says that she had a strep screen which was negative but clinically the physician wanted to treat her as having \"strep\" and therefore patient was given amoxicillin and treated for 10 days.  Shortly after treatment patient says that she continued to have minimal sore throat, difficulty swallowing, low-grade fever, and felt extremely fatigued.  No nausea, vomiting or diarrhea, no other systemic symptoms.      Review of Systems   All other systems reviewed and are negative.      Objective   Physical Exam   Constitutional: She is oriented to person, place, and time. She appears well-developed and well-nourished.   HENT:   Head: Normocephalic.   Right Ear: External ear normal.   Left Ear: External ear normal.   Nose: Nose normal.   Mouth/Throat: Oropharynx is clear and moist.   Eyes: Conjunctivae and EOM are normal. Pupils are equal, round, and reactive to light.   Neck: Normal range of motion. Neck supple.   Cardiovascular: Normal rate, regular rhythm and normal heart sounds.   Pulmonary/Chest: Effort normal and breath sounds normal.   Abdominal: Soft. Bowel sounds are normal.   Musculoskeletal: Normal range of motion.   Neurological: She is alert and oriented to person, place, and time.   Nursing note and vitals reviewed.        Assessment/Plan   Gloria was seen today for mononucleosis.    Diagnoses and all orders for this visit:    Chronic fatigue  -     POC Infectious Mononucleosis Antibody  -     Sedimentation Rate  -     CBC w AUTO Differential; Future  -     Comprehensive " Metabolic Panel  -     CBC w AUTO Differential  -     CBC Auto Differential    Pharyngitis, unspecified etiology  -     POC Infectious Mononucleosis Antibody  -     Mononucleosis Screen; Future  -     Mononucleosis Screen    Acute viral syndrome-symptoms are suggestive of a viral syndrome  Supportive care  Advance diet as tolerated with emphasis on hydration.  Monitor for signs for dehydration.  Continue with Tylenol and or Motrin for fever reduction and or pain control.  Return to clinic if symptoms do not improve.

## 2019-07-23 ENCOUNTER — TELEPHONE (OUTPATIENT)
Dept: INTERNAL MEDICINE | Facility: CLINIC | Age: 19
End: 2019-07-23

## 2019-07-26 DIAGNOSIS — G43.001 MIGRAINE WITHOUT AURA AND WITH STATUS MIGRAINOSUS, NOT INTRACTABLE: ICD-10-CM

## 2019-07-26 DIAGNOSIS — F41.9 ANXIETY: ICD-10-CM

## 2019-07-26 RX ORDER — ESCITALOPRAM OXALATE 10 MG/1
TABLET ORAL
Qty: 30 TABLET | Refills: 0 | Status: SHIPPED | OUTPATIENT
Start: 2019-07-26 | End: 2021-02-02 | Stop reason: SDUPTHER

## 2019-07-26 RX ORDER — NORTRIPTYLINE HYDROCHLORIDE 50 MG/1
CAPSULE ORAL
Qty: 30 CAPSULE | Refills: 0 | Status: SHIPPED | OUTPATIENT
Start: 2019-07-26 | End: 2021-02-02 | Stop reason: SDUPTHER

## 2019-10-27 ENCOUNTER — APPOINTMENT (OUTPATIENT)
Dept: GENERAL RADIOLOGY | Facility: HOSPITAL | Age: 19
End: 2019-10-27

## 2019-10-27 ENCOUNTER — HOSPITAL ENCOUNTER (EMERGENCY)
Facility: HOSPITAL | Age: 19
Discharge: HOME | End: 2019-10-28
Attending: EMERGENCY MEDICINE | Admitting: EMERGENCY MEDICINE

## 2019-10-27 VITALS
RESPIRATION RATE: 18 BRPM | HEART RATE: 67 BPM | BODY MASS INDEX: 25 KG/M2 | SYSTOLIC BLOOD PRESSURE: 119 MMHG | HEIGHT: 61 IN | WEIGHT: 132.4 LBS | DIASTOLIC BLOOD PRESSURE: 77 MMHG | TEMPERATURE: 97.8 F | OXYGEN SATURATION: 99 %

## 2019-10-27 DIAGNOSIS — M79.671 RIGHT FOOT PAIN: Primary | ICD-10-CM

## 2019-10-27 PROCEDURE — 99283 EMERGENCY DEPT VISIT LOW MDM: CPT

## 2019-10-27 PROCEDURE — 73630 X-RAY EXAM OF FOOT: CPT

## 2019-10-28 RX ORDER — NAPROXEN 500 MG/1
500 TABLET ORAL 2 TIMES DAILY WITH MEALS
Qty: 14 TABLET | Refills: 0 | Status: SHIPPED | OUTPATIENT
Start: 2019-10-28 | End: 2019-10-31 | Stop reason: ALTCHOICE

## 2019-10-31 ENCOUNTER — OFFICE VISIT (OUTPATIENT)
Dept: ORTHOPEDIC SURGERY | Facility: CLINIC | Age: 19
End: 2019-10-31

## 2019-10-31 VITALS — HEIGHT: 61 IN | RESPIRATION RATE: 18 BRPM | WEIGHT: 132 LBS | BODY MASS INDEX: 24.92 KG/M2

## 2019-10-31 DIAGNOSIS — M76.821 POSTERIOR TIBIAL TENDINITIS OF RIGHT LEG: Primary | ICD-10-CM

## 2019-10-31 PROCEDURE — 99203 OFFICE O/P NEW LOW 30 MIN: CPT | Performed by: PHYSICIAN ASSISTANT

## 2019-10-31 RX ORDER — INDOMETHACIN 75 MG/1
75 CAPSULE, EXTENDED RELEASE ORAL 2 TIMES DAILY WITH MEALS
Qty: 14 CAPSULE | Refills: 0 | Status: SHIPPED | OUTPATIENT
Start: 2019-10-31 | End: 2020-01-29

## 2019-10-31 NOTE — PROGRESS NOTES
Subjective   Patient ID: Gloria Pina is a 19 y.o. right hand dominant female  Pain of the Right Foot         History of Present Illness  Patient presents as a new patient with complaints of right foot and ankle pain that has been ongoing since 10/27/2019 upon awakening she noticed pain and swelling to the right foot.  She denies injury or trauma.  She did take anti-inflammatories with no improvement.  She was seen in the emergency room had x-rays and placed in a pneumatic boot.  She was also seen at the urgent care the following day and was given a Medrol Dosepak.  Patient denies numbness or tingling.  She states the boot does help alleviate pain.  Pain Score: 8  Pain Location: Foot  Pain Orientation: Right     Pain Descriptors: Throbbing, Aching  Pain Frequency: Constant/continuous  Pain Onset: Ongoing                 Pain Intervention(s): Medication (See MAR), Rest  Result of Injury: No       Past Medical History:   Diagnosis Date   • Allergic    • Anxiety    • Asthma    • Migraine    • Ovarian cyst         Past Surgical History:   Procedure Laterality Date   • APPENDECTOMY  2006   • CHOLECYSTECTOMY  2013   • LAPAROSCOPIC OVARIAN CYSTECTOMY Left 08/16/2018       Family History   Problem Relation Age of Onset   • Migraines Mother    • Anxiety disorder Mother    • Hypertension Maternal Grandfather    • Heart disease Maternal Grandfather        Social History     Socioeconomic History   • Marital status: Single     Spouse name: Not on file   • Number of children: Not on file   • Years of education: Not on file   • Highest education level: Not on file   Occupational History   • Occupation:      Employer: [employer]   Tobacco Use   • Smoking status: Never Smoker   • Smokeless tobacco: Never Used   Substance and Sexual Activity   • Alcohol use: No   • Drug use: No   • Sexual activity: Yes     Partners: Male     Birth control/protection: None         Current Outpatient Medications:   •  escitalopram  "(LEXAPRO) 10 MG tablet, TAKE 1 TABLET BY MOUTH EVERY DAY, Disp: 30 tablet, Rfl: 0  •  Etonogestrel (NEXPLANON) 68 MG implant subdermal implant, Inject 1 each into the appropriate area of the skin as directed by provider 1 (One) Time., Disp: , Rfl:   •  indomethacin SR (INDOCIN SR) 75 MG CR capsule, Take 1 capsule by mouth 2 (Two) Times a Day With Meals., Disp: 14 capsule, Rfl: 0  •  levocetirizine (XYZAL) 5 MG tablet, Take 1 tablet by mouth Every Evening., Disp: 30 tablet, Rfl: 5  •  nortriptyline (PAMELOR) 50 MG capsule, TAKE 1 CAPSULE BY MOUTH EVERYDAY AT BEDTIME, Disp: 30 capsule, Rfl: 0  •  rizatriptan (MAXALT) 10 MG tablet, Take 1 tablet by mouth As Needed for Migraine (take 1 at onset of migraine. Repeat every 2 hrs prn. Max. of 3 in 24 hours) for up to 1 dose., Disp: 8 tablet, Rfl: 2    Allergies   Allergen Reactions   • Other Other (See Comments)     Pet dander (cats) eyes get dry and nose runs       Review of Systems   Constitutional: Negative for diaphoresis, fever and unexpected weight change.   HENT: Negative for dental problem and sore throat.    Eyes: Negative for visual disturbance.   Respiratory: Negative for shortness of breath.    Cardiovascular: Negative for chest pain.   Gastrointestinal: Negative for abdominal pain, constipation, diarrhea, nausea and vomiting.   Genitourinary: Negative for difficulty urinating and frequency.   Musculoskeletal: Positive for arthralgias (right foot).   Neurological: Negative for headaches.   Hematological: Does not bruise/bleed easily.       I have reviewed the above medical and surgical history, family history, social history, medications, allergies and review of systems.    Objective   Resp 18   Ht 154.9 cm (61\")   Wt 59.9 kg (132 lb)   LMP 10/20/2019   BMI 24.94 kg/m²    Physical Exam   Constitutional: She is oriented to person, place, and time. She appears well-developed and well-nourished.   Eyes: Conjunctivae are normal.   Neck: Neck supple. "   Pulmonary/Chest: Effort normal.   Musculoskeletal:        Right knee: No tenderness found. No medial joint line and no lateral joint line tenderness noted.        Right ankle: She exhibits no ecchymosis and no deformity. No AITFL, no CF ligament, no posterior TFL, no head of 5th metatarsal and no proximal fibula tenderness found. Achilles tendon exhibits pain. Achilles tendon exhibits no defect and normal Reyes's test results.        Right foot: There is normal range of motion, no tenderness, no bony tenderness, no swelling, normal capillary refill, no crepitus and no deformity.        Neurological: She is alert and oriented to person, place, and time.   Psychiatric: She has a normal mood and affect. Her behavior is normal.   Nursing note and vitals reviewed.    Ortho Exam   Extremity DVT signs are Negative on physical exam with negative Chas sign, with no calf pain, with no palpable cords, with no increased pain with passive stretch/extension and with no skin tone change   Neurologic Exam     Mental Status   Oriented to person, place, and time.          There is no swelling or redness to the retrocalcaneal bursa of the right foot      Assessment/Plan   Independent Review of Radiographic Studies:    No new imaging done today.      Procedures       Gloria was seen today for pain.    Diagnoses and all orders for this visit:    Posterior tibial tendinitis of right leg    Other orders  -     indomethacin SR (INDOCIN SR) 75 MG CR capsule; Take 1 capsule by mouth 2 (Two) Times a Day With Meals.       Orthopedic activities reviewed and patient expressed appreciation  Discussion of orthopedic goals  Risk, benefits, and merits of treatment alternatives reviewed with the patient and questions answered  Avoid offending activity  Ice, heat, and/or modalities as beneficial    Recommendations/Plan:  Patient is encouraged to call or return for any issues or concerns.    I will prescribe indomethacin to be taken twice daily  with food.  If you develop any abdominal pain please stop taking the indomethacin right away    Patient was instructed to purchase over-the-counter gel heel inserts.    Must ice the right ankle 20 minutes on 1 hour off at least 3 times daily.  Must wear the pneumatic boot anytime you are upright and ambulating.  Please call the office when you return from [facility] to set up a physical therapy appointment  Patient agreeable to call or return sooner for any concerns.           EMR Dragon-transcription disclaimer:  This encounter note is an electronic transcription of spoken language to printed text.  Electronic transcription of spoken language may permit erroneous or at times nonsensical words or phrases to be inadvertently transcribed.  Although I have reviewed the note for such errors, some may still exist

## 2019-11-27 ENCOUNTER — TELEPHONE (OUTPATIENT)
Dept: ORTHOPEDIC SURGERY | Facility: CLINIC | Age: 19
End: 2019-11-27

## 2019-11-27 DIAGNOSIS — M76.821 POSTERIOR TIBIAL TENDINITIS OF RIGHT LEG: Primary | ICD-10-CM

## 2019-12-11 ENCOUNTER — TREATMENT (OUTPATIENT)
Dept: PHYSICAL THERAPY | Facility: CLINIC | Age: 19
End: 2019-12-11

## 2019-12-11 DIAGNOSIS — M25.571 CHRONIC PAIN OF RIGHT ANKLE: ICD-10-CM

## 2019-12-11 DIAGNOSIS — G89.29 CHRONIC PAIN OF RIGHT ANKLE: ICD-10-CM

## 2019-12-11 DIAGNOSIS — M76.829 TIBIALIS POSTERIOR DYSFUNCTION: Primary | ICD-10-CM

## 2019-12-11 PROCEDURE — 97530 THERAPEUTIC ACTIVITIES: CPT | Performed by: PHYSICAL THERAPIST

## 2019-12-11 PROCEDURE — 97161 PT EVAL LOW COMPLEX 20 MIN: CPT | Performed by: PHYSICAL THERAPIST

## 2019-12-11 PROCEDURE — 97110 THERAPEUTIC EXERCISES: CPT | Performed by: PHYSICAL THERAPIST

## 2019-12-11 NOTE — PROGRESS NOTES
Physical Therapy Initial Evaluation and Plan of Care      Patient: Gloria Pina   : 2000  Diagnosis/ICD-10 Code:  No primary diagnosis found.  Referring practitioner: ALPA Urena*    Subjective Evaluation    History of Present Illness  Mechanism of injury: R ankle/foot Oct 27th she woke up with foot pain and it kept getting worse.      Boot was put on in one week and the boot seemed to help.  She had to go to [facility] 10 days.  After walking too much and standing too much her pain then got worse.      Sometimes she goes without the boot for about 30 minutes then pain.          Patient Occupation: [employer]  25 hours in 4 days.   Pain  Current pain ratin (walking with the boot 3/10.  )  At worst pain ratin  Location: Medial arch and medial ankle.   Quality: throbbing and sharp  Relieving factors: rest, support and relaxation (the boot, heat, )  Aggravating factors: standing, ambulation and prolonged positioning    Treatments  Previous treatment: physical therapy (knees in high school)  Patient Goals  Patient goals for therapy: return to work, return to sport/leisure activities, independence with ADLs/IADLs and increased strength             Objective       Observations     Right Ankle/Foot   Positive for edema. Right foot drainage:        Palpation     Right   Hypertonic in the medial gastrocnemius, posterior tibialis and soleus. Tenderness of the medial gastrocnemius, posterior tibialis and soleus.     Tenderness     Right Ankle/Foot   Tenderness in the medial malleolus and posterior tibial tendon.     Active Range of Motion     Right Ankle/Foot   Dorsiflexion (ke): 8 degrees with pain  Plantar flexion: 50 degrees   Inversion: 40 degrees   Eversion: 10 degrees with pain    Passive Range of Motion     Right Ankle/Foot    Dorsiflexion (ke): with pain  Plantar flexion: with pain    Joint Play     Additional Joint Play Details  Limited testing due to pain and  guarding      Strength/Myotome Testing     Right Ankle/Foot   Dorsiflexion: 3-  Plantar flexion: 2-  Inversion: 2  Eversion: 3  Great toe flexion: 4    General Comments     Ankle/Foot Comments   Post taping and new orthotic fitting she was 0/10 pain in stance with boot present.          Assessment & Plan     Assessment  Impairments: abnormal gait, abnormal or restricted ROM, activity intolerance, impaired balance, impaired physical strength, lacks appropriate home exercise program, pain with function and weight-bearing intolerance  Assessment details: Patient is a 19 year old female who comes to physical therapy with PTT.  Signs and symptoms are consistent with diagnosis.  She has a very poor orthotic and she has been standing with pain and limited support.  The patient currently has pain, decreased ROM, decreased strength, and inability to perform all essential functional activities. Pt will benefit from skilled PT services to address the above issues.     Prognosis: fair  Prognosis details:     SHORT TERM GOALS:     8 weeks  1. Pt independent with HEP   2. Pt to demonstrate ability to ambulate in the clinic without walking boot and with no reports of increased pain  3. Pt to demonstrate ability to ambulate in the clinic without antalgic gait   3. Pt to demonstrate ability to perform single leg heel raise on the right without increase in pain      LONG TERM GOALS:   6 weeks  1. Pt to demonstrate ability to perform full functional squat with good form and no increase in pain in the right foot/ankle  2. Pt to demonstrate ability to ambulate on TM for 10 minutes with normal gait pattern without increase in pain  3. Pt to report being able to work full shift without increase in pain in the right ankle/foot  4. Pt to demonstrate ability to perform SLS on the right lower extremity for 30 seconds without LOB or increased pain     Functional Limitations: walking, uncomfortable because of pain, standing and unable to  perform repetitive tasks  Plan  Therapy options: will be seen for skilled physical therapy services  Planned modality interventions: cryotherapy, contrast bath immersion, thermotherapy (hydrocollator packs) and ultrasound  Planned therapy interventions: gait training, functional ROM exercises, flexibility, home exercise program, IADL retraining, joint mobilization, therapeutic activities, stretching, soft tissue mobilization, motor coordination training, manual therapy, strengthening, balance/weight-bearing training and ADL retraining  Duration in visits: 10  Treatment plan discussed with: patient        Manual Therapy:    9     mins  49252;  Therapeutic Exercise:         mins  00105;     Neuromuscular Stuart:        mins  91246;    Therapeutic Activity:     15     mins  24804;     Gait Training:           mins  86015;     Ultrasound:          mins  03043;    Electrical Stimulation:         mins  18875 ( );  Dry Needling          mins self-pay    Timed Treatment:   24   mins   Total Treatment:     49   mins    PT SIGNATURE: Fortino Soriano, PT   DATE TREATMENT INITIATED: 12/11/2019    Initial Certification  Certification Period: 3/10/2020  I certify that the therapy services are furnished while this patient is under my care.  The services outlined above are required by this patient, and will be reviewed every 90 days.     PHYSICIAN: Nikunj Cam PA-C      DATE:     Please sign and return via fax to  .. Thank you, [facility].

## 2019-12-16 ENCOUNTER — TREATMENT (OUTPATIENT)
Dept: PHYSICAL THERAPY | Facility: CLINIC | Age: 19
End: 2019-12-16

## 2019-12-16 DIAGNOSIS — G89.29 CHRONIC PAIN OF RIGHT ANKLE: ICD-10-CM

## 2019-12-16 DIAGNOSIS — M25.571 CHRONIC PAIN OF RIGHT ANKLE: ICD-10-CM

## 2019-12-16 DIAGNOSIS — M76.829 TIBIALIS POSTERIOR DYSFUNCTION: Primary | ICD-10-CM

## 2019-12-16 PROCEDURE — 97530 THERAPEUTIC ACTIVITIES: CPT | Performed by: PHYSICAL THERAPIST

## 2019-12-16 PROCEDURE — 97110 THERAPEUTIC EXERCISES: CPT | Performed by: PHYSICAL THERAPIST

## 2019-12-16 PROCEDURE — 97140 MANUAL THERAPY 1/> REGIONS: CPT | Performed by: PHYSICAL THERAPIST

## 2019-12-16 NOTE — PROGRESS NOTES
Physical Therapy Daily Progress Note    Patient Information  Gloria Pina  2000      Visit # : 2    Gloria Pina reports 1/10 pain today at rest.  Pt with good response to taping.  The orthotic feels better but the mid arch is more sore from the height of the orthotic        Objective Pt presents to PT today with no distress noted at rest.  Pt is wearing boot and is having moderate antalgia into PT area.     Pt with less tenderness noted in the foot.  She can perform more ROM today with less pain.    Taping alleviated pain.    See Exercise, Manual, and Modality Logs for complete treatment.     Assessment/Plan  Pt with improved status.  She seems to be doing better with the new orthotic.       Progress per Plan of Care and Progress strengthening /stabilization /functional activity      Visit Diagnoses:    ICD-10-CM ICD-9-CM   1. Tibialis posterior dysfunction M76.829 728.3   2. Chronic pain of right ankle M25.571 719.47    G89.29 338.29            Manual Therapy:    12     mins  01522;  Therapeutic Exercise:    28     mins  30036;     Neuromuscular Stuart:        mins  62709;    Therapeutic Activity:     12     mins  02029;     Gait Training:        ___  mins  60467;     Ultrasound:          mins  85430;    Electrical Stimulation:         mins  17076 ( );  Dry Needling          mins self-pay    Timed Treatment:   52   mins   Total Treatment:     52   mins    Fortino Soriano, PT  Physical Therapist

## 2019-12-23 ENCOUNTER — TREATMENT (OUTPATIENT)
Dept: PHYSICAL THERAPY | Facility: CLINIC | Age: 19
End: 2019-12-23

## 2019-12-23 DIAGNOSIS — M76.829 TIBIALIS POSTERIOR DYSFUNCTION: Primary | ICD-10-CM

## 2019-12-23 DIAGNOSIS — M25.571 CHRONIC PAIN OF RIGHT ANKLE: ICD-10-CM

## 2019-12-23 DIAGNOSIS — G89.29 CHRONIC PAIN OF RIGHT ANKLE: ICD-10-CM

## 2019-12-23 PROCEDURE — 97110 THERAPEUTIC EXERCISES: CPT | Performed by: PHYSICAL THERAPIST

## 2019-12-23 PROCEDURE — 97035 APP MDLTY 1+ULTRASOUND EA 15: CPT | Performed by: PHYSICAL THERAPIST

## 2019-12-23 PROCEDURE — 97140 MANUAL THERAPY 1/> REGIONS: CPT | Performed by: PHYSICAL THERAPIST

## 2019-12-23 NOTE — PROGRESS NOTES
Physical Therapy Daily Progress Note    Patient Information  Gloria Pina  2000      Visit # : 3    Gloria Pina reports 0/10 pain today at rest.  Pt reports she is feeling better and her day is starting off pain free.  Working this weekend was painful.          Objective Pt presents to PT today with less distress and discomfort noted in the foot and ankle.     Pt with less pain upon palpation in the foot and ankle.    Pt able to bear weight with tape on pain free. She is unable to bear weight on the foot without pain without the tape.     See Exercise, Manual, and Modality Logs for complete treatment.     Assessment/Plan  Pt with improved overall status but she is standing a lot and that seems to be keeping the foot/ankle irritated.       Progress per Plan of Care and Progress strengthening /stabilization /functional activity  Continue with PT once she returns from [place].     Visit Diagnoses:    ICD-10-CM ICD-9-CM   1. Tibialis posterior dysfunction M76.829 728.3   2. Chronic pain of right ankle M25.571 719.47    G89.29 338.29            Manual Therapy:    11     mins  03778;  Therapeutic Exercise:    26     mins  93634;     Neuromuscular Stuart:        mins  32248;    Therapeutic Activity:     4     mins  80928;     Gait Training:        ___  mins  35516;     Ultrasound:     10     mins  68168;    Electrical Stimulation:         mins  07942 ( );  Dry Needling          mins self-pay    Timed Treatment:   51   mins   Total Treatment:     51   mins    Fortino Soriano, PT  Physical Therapist

## 2020-01-29 ENCOUNTER — OFFICE VISIT (OUTPATIENT)
Dept: OBSTETRICS AND GYNECOLOGY | Facility: CLINIC | Age: 20
End: 2020-01-29

## 2020-01-29 VITALS
BODY MASS INDEX: 26.01 KG/M2 | WEIGHT: 137.8 LBS | DIASTOLIC BLOOD PRESSURE: 72 MMHG | SYSTOLIC BLOOD PRESSURE: 112 MMHG | HEIGHT: 61 IN

## 2020-01-29 DIAGNOSIS — R10.32 LEFT LOWER QUADRANT ABDOMINAL PAIN: Primary | ICD-10-CM

## 2020-01-29 PROCEDURE — 99213 OFFICE O/P EST LOW 20 MIN: CPT | Performed by: OBSTETRICS & GYNECOLOGY

## 2020-01-29 NOTE — PROGRESS NOTES
Subjective   Chief Complaint   Patient presents with   • Gynecologic Exam     est care since Dr. Garrett schmitt     Gloria Pina is a 19 y.o. year old  presenting to be seen for her annual exam.     SEXUAL Hx:  She is currently sexually active.  In the past year there there has been NO new sexual partners.    Condoms are always used.  She would not like to be screened for STD's at today's exam.  Current birth control method: Nexplanon.  She is happy with her current method of contraception and does not want to discuss alternative methods of contraception.  MENSTRUAL Hx:  Patient's last menstrual period was 01/10/2020 (approximate).  In the past 6 months her cycles have been regular, predictable and occur monthly.  Her menstrual flow is typically light.   Each month on average there are roughly 0 day(s) of very heavy flow.  She reports she will have spotting for almost two weeks.  Intermenstrual bleeding is absent.    Post-coital bleeding is absent.  Dysmenorrhea: none  PMS: none  Her cycles are not a source of concern for her that she wishes to discuss today.  HEALTH Hx:  She exercises regularly: yes.  She wears her seat belt: yes.  She has concerns about domestic violence: no.  OTHER THINGS SHE WANTS TO DISCUSS TODAY:  Abdominal Pain -- patient reports left lower quadrant for the last 3 months; she describes as cramping pain. She denies any pain with menses. Alleviating factors: heating pad, rest. Aggravating factors: standing, walking, certain foods such as red meat. Patient reports normal bowel movement--2-3 times a day; denies straining. Patient has a history of endometriosis. She was diagnosed by laparoscopy; at time of laparoscopy, she would also noted to have distended bowel in LLQ.    The following portions of the patient's history were reviewed and updated as appropriate:problem list, current medications, allergies, past family history, past medical history, past social history and past surgical  "history.    Social History    Tobacco Use      Smoking status: Never Smoker      Smokeless tobacco: Never Used    Review of Systems  Constitutional POS: nothing reported    NEG: anorexia or night sweats   Genitourinary POS: nothing reported    NEG: dysuria or hematuria      Gastointestinal POS: nothing reported    NEG: bloating, change in bowel habits, melena or reflux symptoms   Integument POS: nothing reported    NEG: moles that are changing in size, shape, color or rashes   Breast POS: nothing reported    NEG: persistent breast lump, skin dimpling or nipple discharge        Objective   /72   Ht 154.9 cm (61\")   Wt 62.5 kg (137 lb 12.8 oz)   LMP 01/10/2020 (Approximate)   Breastfeeding No   BMI 26.04 kg/m²     General:  well developed; well nourished  no acute distress   Skin:  Not performed.   Thyroid: not examined   Breasts:  Not performed.   Abdomen: Not performed.   Pelvis: Not performed.        Assessment   1. LLQ Pain     Plan   1. Patient declines exam. She reports exams are very painful for her and she is not prepared for it today and desires to come back at another time. She states she has never been able to tolerate an exam. She reports that sometimes intercourse is painful and sometimes it is not. Explained to patient that in order to adequately assess pain an exam is recommended. Also educated her that routine pap smears are now recommend every 3 years given she is now 19. Patient report she will call back to schedule. In the interim, I asked patient to keep a pain journal that she will bring to her next appointment.  2. No prescription was given or electronically sent at today's visit  3. The importance of keeping all planned follow-up and taking all medications as prescribed was emphasized.  4. Follow up for pelvic exam, pap smear and to further discuss abdominal pain when patient desires     No orders of the defined types were placed in this encounter.         This note was electronically " signed.    Urmila Amaral, DO  January 29, 2020    Note: Speech recognition transcription software may have been used to create portions of this document.  An attempt at proofreading has been made but errors in transcription could still be present.

## 2020-02-25 RX ORDER — LEVOCETIRIZINE DIHYDROCHLORIDE 5 MG/1
TABLET, FILM COATED ORAL
Qty: 90 TABLET | Refills: 1 | Status: SHIPPED | OUTPATIENT
Start: 2020-02-25 | End: 2021-02-02 | Stop reason: SDUPTHER

## 2020-08-04 PROCEDURE — 87088 URINE BACTERIA CULTURE: CPT | Performed by: FAMILY MEDICINE

## 2020-08-04 PROCEDURE — 87086 URINE CULTURE/COLONY COUNT: CPT | Performed by: FAMILY MEDICINE

## 2020-08-04 PROCEDURE — 87186 SC STD MICRODIL/AGAR DIL: CPT | Performed by: FAMILY MEDICINE

## 2020-08-06 ENCOUNTER — TELEPHONE (OUTPATIENT)
Dept: URGENT CARE | Facility: CLINIC | Age: 20
End: 2020-08-06

## 2020-08-31 ENCOUNTER — TELEMEDICINE (OUTPATIENT)
Dept: FAMILY MEDICINE CLINIC | Facility: TELEHEALTH | Age: 20
End: 2020-08-31

## 2020-08-31 VITALS — TEMPERATURE: 98.4 F

## 2020-08-31 DIAGNOSIS — J32.9 SINUSITIS, UNSPECIFIED CHRONICITY, UNSPECIFIED LOCATION: Primary | ICD-10-CM

## 2020-08-31 PROBLEM — G43.909 MIGRAINE: Status: ACTIVE | Noted: 2020-08-31

## 2020-08-31 PROCEDURE — 99213 OFFICE O/P EST LOW 20 MIN: CPT | Performed by: NURSE PRACTITIONER

## 2020-08-31 RX ORDER — FLUTICASONE PROPIONATE 50 MCG
2 SPRAY, SUSPENSION (ML) NASAL DAILY
Qty: 16 G | Refills: 0 | Status: SHIPPED | OUTPATIENT
Start: 2020-08-31 | End: 2020-12-02

## 2020-08-31 RX ORDER — AZITHROMYCIN 250 MG/1
TABLET, FILM COATED ORAL
Qty: 6 TABLET | Refills: 0 | Status: SHIPPED | OUTPATIENT
Start: 2020-08-31 | End: 2021-06-14

## 2020-08-31 NOTE — PROGRESS NOTES
"Gloria Pina  2000    Chief Complaint   Patient presents with   • Nasal Congestion       HPI  Gloria Pina is a 20 y.o. female with complaints of left ear pain and pressure, nasal blockage, post nasal drip, sinus and nasal congestion and bilateral sinus pain. Symptoms began 5 days ago. Associated symptoms include bilateral ear congestion, facial pain bilaterally and tooth pain bilaterally upper. Patient denies cough, loss of smell or taste. She missed work and had a Covid test that she reports was negative but her employer requires her to have a return to work note. Patient does not a history of asthma. Patient does not smoke cigarettes. She has a hx of seasonal allergies and has \"at least 5 sinus infections a year\". She uses sudafed and xyzal.       The following portions of the patient's history were reviewed and updated as appropriate: allergies, current medications, past social history and problem list.    Past Medical History:   Diagnosis Date   • Allergic    • Anxiety    • Asthma    • Endometriosis    • Migraine with Aura      Social History     Socioeconomic History   • Marital status: Single     Spouse name: Not on file   • Number of children: Not on file   • Years of education: Not on file   • Highest education level: Not on file   Occupational History   • Occupation: [employer]     Employer: [employer]   Tobacco Use   • Smoking status: Never Smoker   • Smokeless tobacco: Never Used   Substance and Sexual Activity   • Alcohol use: Yes     Frequency: 2-4 times a month   • Drug use: No   • Sexual activity: Yes     Partners: Male     Birth control/protection: Implant       REVIEW OF SYSTEMS  History obtained from the patient  General ROS: negative for - chills or fever  ENT ROS: positive for - nasal congestion and sinus pain  negative for - sore throat  Allergy and Immunology ROS: positive for - nasal congestion, postnasal drip and seasonal allergies  negative for - hives or itchy/watery " eyes  Respiratory ROS: no cough, shortness of breath, or wheezing    PHYSICAL EXAM  Temp 98.4 °F (36.9 °C) (Oral)     CONSTITUTIONAL:alert, well appearing, and in no distress  ENT:pharynx erythematous without exudate and sinuses nontender  CHEST:respiratory effort normal   PSYCH:alert, normal affect and speech     Gloria was seen today for nasal congestion.    Diagnoses and all orders for this visit:    Sinusitis, unspecified chronicity, unspecified location  -     azithromycin (Zithromax Z-Cruzito) 250 MG tablet; Take 2 tablets the first day, then 1 tablet daily for 4 days.  -     fluticasone (Flonase) 50 MCG/ACT nasal spray; 2 sprays into the nostril(s) as directed by provider Daily for 30 days. Administer 2 sprays in each nostril for each dose.        [facility]/pharmacy #[id number] - [place], [place] - [address] [place] [address] AT [facility] - [phone]  - [phone]   [address] [facility] [address]  Phone: [phone] Fax: [phone]    Counseling:  diagnosis, evaluation, treatment options, management and plan of care  Resp recommendations: apply heat to sinuses for pain  use a vaporizer prn  Discussed medication dosage, usage, side effects, and goals of treatment in detail.  Sinus Plan: Continue nasal sprays as previously prescribed.  For the best response, use your nasal sprays every day without skipping doses. It may take several weeks before the full effect is acheived.       This visit was performed via Telehealth. This patient has been instructed to follow up with their primary care provider or Urgent Care if their symptoms worsen or there is no improvement with the treatment provided for the illness. The patient has been instructed to go to the nearest Emergency Department for high fever, chest pain, shortness of breath or any other life-threatening symptoms.       Joseline Mcgrath, GURJIT  08/31/20  3:45 PM

## 2020-08-31 NOTE — PATIENT INSTRUCTIONS
Sinusitis, Adult  Sinusitis is soreness and swelling (inflammation) of your sinuses. Sinuses are hollow spaces in the bones around your face. They are located:  · Around your eyes.  · In the middle of your forehead.  · Behind your nose.  · In your cheekbones.  Your sinuses and nasal passages are lined with a fluid called mucus. Mucus drains out of your sinuses. Swelling can trap mucus in your sinuses. This lets germs (bacteria, virus, or fungus) grow, which leads to infection. Most of the time, this condition is caused by a virus.  What are the causes?  This condition is caused by:  · Allergies.  · Asthma.  · Germs.  · Things that block your nose or sinuses.  · Growths in the nose (nasal polyps).  · Chemicals or irritants in the air.  · Fungus (rare).  What increases the risk?  You are more likely to develop this condition if:  · You have a weak body defense system (immune system).  · You do a lot of swimming or diving.  · You use nasal sprays too much.  · You smoke.  What are the signs or symptoms?  The main symptoms of this condition are pain and a feeling of pressure around the sinuses. Other symptoms include:  · Stuffy nose (congestion).  · Runny nose (drainage).  · Swelling and warmth in the sinuses.  · Headache.  · Toothache.  · A cough that may get worse at night.  · Mucus that collects in the throat or the back of the nose (postnasal drip).  · Being unable to smell and taste.  · Being very tired (fatigue).  · A fever.  · Sore throat.  · Bad breath.  How is this diagnosed?  This condition is diagnosed based on:  · Your symptoms.  · Your medical history.  · A physical exam.  · Tests to find out if your condition is short-term (acute) or long-term (chronic). Your doctor may:  ? Check your nose for growths (polyps).  ? Check your sinuses using a tool that has a light (endoscope).  ? Check for allergies or germs.  ? Do imaging tests, such as an MRI or CT scan.  How is this treated?  Treatment for this condition  depends on the cause and whether it is short-term or long-term.  · If caused by a virus, your symptoms should go away on their own within 10 days. You may be given medicines to relieve symptoms. They include:  ? Medicines that shrink swollen tissue in the nose.  ? Medicines that treat allergies (antihistamines).  ? A spray that treats swelling of the nostrils.   ? Rinses that help get rid of thick mucus in your nose (nasal saline washes).  · If caused by bacteria, your doctor may wait to see if you will get better without treatment. You may be given antibiotic medicine if you have:  ? A very bad infection.  ? A weak body defense system.  · If caused by growths in the nose, you may need to have surgery.  Follow these instructions at home:  Medicines  · Take, use, or apply over-the-counter and prescription medicines only as told by your doctor. These may include nasal sprays.  · If you were prescribed an antibiotic medicine, take it as told by your doctor. Do not stop taking the antibiotic even if you start to feel better.  Hydrate and humidify    · Drink enough water to keep your pee (urine) pale yellow.  · Use a cool mist humidifier to keep the humidity level in your home above 50%.  · Breathe in steam for 10-15 minutes, 3-4 times a day, or as told by your doctor. You can do this in the bathroom while a hot shower is running.  · Try not to spend time in cool or dry air.  Rest  · Rest as much as you can.  · Sleep with your head raised (elevated).  · Make sure you get enough sleep each night.  General instructions    · Put a warm, moist washcloth on your face 3-4 times a day, or as often as told by your doctor. This will help with discomfort.  · Wash your hands often with soap and water. If there is no soap and water, use hand .  · Do not smoke. Avoid being around people who are smoking (secondhand smoke).  · Keep all follow-up visits as told by your doctor. This is important.  Contact a doctor if:  · You  have a fever.  · Your symptoms get worse.  · Your symptoms do not get better within 10 days.  Get help right away if:  · You have a very bad headache.  · You cannot stop throwing up (vomiting).  · You have very bad pain or swelling around your face or eyes.  · You have trouble seeing.  · You feel confused.  · Your neck is stiff.  · You have trouble breathing.  Summary  · Sinusitis is swelling of your sinuses. Sinuses are hollow spaces in the bones around your face.  · This condition is caused by tissues in your nose that become inflamed or swollen. This traps germs. These can lead to infection.  · If you were prescribed an antibiotic medicine, take it as told by your doctor. Do not stop taking it even if you start to feel better.  · Keep all follow-up visits as told by your doctor. This is important.  This information is not intended to replace advice given to you by your health care provider. Make sure you discuss any questions you have with your health care provider.  Document Released: 06/05/2009 Document Revised: 05/20/2019 Document Reviewed: 05/20/2019  [employer] Patient Education © 2020 [employer]

## 2020-12-02 ENCOUNTER — TELEMEDICINE (OUTPATIENT)
Dept: FAMILY MEDICINE CLINIC | Facility: TELEHEALTH | Age: 20
End: 2020-12-02

## 2020-12-02 DIAGNOSIS — H66.002 ACUTE SUPPURATIVE OTITIS MEDIA OF LEFT EAR WITHOUT SPONTANEOUS RUPTURE OF TYMPANIC MEMBRANE, RECURRENCE NOT SPECIFIED: Primary | ICD-10-CM

## 2020-12-02 PROCEDURE — 99213 OFFICE O/P EST LOW 20 MIN: CPT | Performed by: NURSE PRACTITIONER

## 2020-12-02 RX ORDER — FLUTICASONE PROPIONATE 50 MCG
2 SPRAY, SUSPENSION (ML) NASAL DAILY
Qty: 1 BOTTLE | Refills: 0 | Status: SHIPPED | OUTPATIENT
Start: 2020-12-02 | End: 2021-06-14

## 2020-12-02 RX ORDER — AMOXICILLIN 875 MG/1
875 TABLET, COATED ORAL 2 TIMES DAILY
Qty: 20 TABLET | Refills: 0 | Status: SHIPPED | OUTPATIENT
Start: 2020-12-02 | End: 2020-12-12

## 2020-12-02 NOTE — PROGRESS NOTES
CHIEF COMPLAINT  No chief complaint on file.        HPI  Gloria Pina is a 20 y.o. female  presents with complaint of 4 day history of left ear pain with soreness down into neck, having pain around left eye from ear, nasal congestion.  Denies fever.  Also had COVID-19 test today which was negative    Review of Systems   Constitutional: Negative for activity change, appetite change, fatigue and fever.   HENT: Positive for congestion, ear pain and sore throat.    Respiratory: Negative for cough, chest tightness, shortness of breath and wheezing.    Neurological: Negative for dizziness and headaches.   All other systems reviewed and are negative.      Past Medical History:   Diagnosis Date   • Allergic    • Anxiety    • Asthma    • Endometriosis    • Migraine with Aura        Family History   Problem Relation Age of Onset   • Migraines Mother    • Anxiety disorder Mother    • Hypertension Maternal Grandfather    • Heart disease Maternal Grandfather    • Breast cancer Neg Hx    • Ovarian cancer Neg Hx    • Uterine cancer Neg Hx    • Endometrial cancer Neg Hx    • Colon cancer Neg Hx        Social History     Socioeconomic History   • Marital status: Single     Spouse name: Not on file   • Number of children: Not on file   • Years of education: Not on file   • Highest education level: Not on file   Occupational History   • Occupation: [place]     Employer: [employer]   Tobacco Use   • Smoking status: Never Smoker   • Smokeless tobacco: Never Used   Substance and Sexual Activity   • Alcohol use: Yes     Frequency: 2-4 times a month   • Drug use: No   • Sexual activity: Yes     Partners: Male     Birth control/protection: Implant         There were no vitals taken for this visit.    PHYSICAL EXAM  Physical Exam   Constitutional: She is oriented to person, place, and time. She appears well-developed and well-nourished.   HENT:   Head: Normocephalic and atraumatic.   Tenderness in pre auricular region of left ear;  no sinus tenderness   Pulmonary/Chest: Effort normal.  No respiratory distress.  Lymphadenopathy:   Tenderness of left anterior cervical region   Neurological: She is alert and oriented to person, place, and time.         Diagnoses and all orders for this visit:    1. Acute suppurative otitis media of left ear without spontaneous rupture of tympanic membrane, recurrence not specified (Primary)  -     amoxicillin (AMOXIL) 875 MG tablet; Take 1 tablet by mouth 2 (Two) Times a Day for 10 days.  Dispense: 20 tablet; Refill: 0  -     fluticasone (FLONASE) 50 MCG/ACT nasal spray; 2 sprays into the nostril(s) as directed by provider Daily.  Dispense: 1 bottle; Refill: 0  --complete Amoxicillin as prescribed for ear infection  --flonase daily until symptoms resolve, then may use as needed for congestion, runny nose, ear pain  --increase intake of clear, decaffeinated fluids to thin secretions and maintain hydration  --continue Xyzal for allergies  --if no improvement in 5-7 days, will need follow-up for further evaluation      FOLLOW-UP  As discussed during visit with PCP/[facility] if no improvement or Urgent Care/Emergency Department if worsening of symptoms    Patient verbalizes understanding of medication dosage, comfort measures, instructions for treatment and follow-up.    GURJIT Prado  12/02/2020  12:47 EST    This visit was performed via Telehealth.  This patient has been instructed to follow-up with their primary care provider if their symptoms worsen or the treatment provided does not resolve their illness.

## 2021-02-01 ENCOUNTER — TELEPHONE (OUTPATIENT)
Dept: INTERNAL MEDICINE | Facility: CLINIC | Age: 21
End: 2021-02-01

## 2021-02-01 DIAGNOSIS — G43.001 MIGRAINE WITHOUT AURA AND WITH STATUS MIGRAINOSUS, NOT INTRACTABLE: ICD-10-CM

## 2021-02-01 DIAGNOSIS — F41.9 ANXIETY: ICD-10-CM

## 2021-02-01 RX ORDER — ESCITALOPRAM OXALATE 10 MG/1
10 TABLET ORAL DAILY
Qty: 30 TABLET | Refills: 0 | OUTPATIENT
Start: 2021-02-01

## 2021-02-01 RX ORDER — LEVOCETIRIZINE DIHYDROCHLORIDE 5 MG/1
5 TABLET, FILM COATED ORAL EVERY EVENING
Qty: 90 TABLET | Refills: 1 | OUTPATIENT
Start: 2021-02-01

## 2021-02-01 RX ORDER — NORTRIPTYLINE HYDROCHLORIDE 50 MG/1
50 CAPSULE ORAL
Qty: 30 CAPSULE | Refills: 0 | OUTPATIENT
Start: 2021-02-01

## 2021-02-01 NOTE — TELEPHONE ENCOUNTER
Caller: Yovani Gloria    Relationship: Self    Best call back number: [phone]   Medication needed:   Requested Prescriptions     Pending Prescriptions Disp Refills   • escitalopram (LEXAPRO) 10 MG tablet 30 tablet 0     Sig: Take 1 tablet by mouth Daily.   • levocetirizine (XYZAL) 5 MG tablet 90 tablet 1     Sig: Take 1 tablet by mouth Every Evening.   • nortriptyline (PAMELOR) 50 MG capsule 30 capsule 0     Sig: Take 1 capsule by mouth every night at bedtime.       When do you need the refill by: SOON    What details did the patient provide when requesting the medication: PATIENT HAS A FEW PILLS LEFT    Does the patient have less than a 3 day supply:  [] Yes  [x] No    What is the patient's preferred pharmacy: [facility] #[id number] - [address] - [phone] [facility] [phone]       PATIENT ALSO REQUESTED A PRESCRIPTION FOR ALBUTEROL INHALER.  PATIENT STATED THAT SHE HAS BEEN EXPOSED TO COVID AND IS HAVING SYMPTOMS.

## 2021-02-01 NOTE — TELEPHONE ENCOUNTER
Spoke to patient she is scheduled for a VV tomorrow morning because she lives with someone that has COVID and she has started to show symptoms. Patient says that she really needs her inhaler because she has asthma. Can you call this in today for her?

## 2021-02-02 ENCOUNTER — TELEMEDICINE (OUTPATIENT)
Dept: INTERNAL MEDICINE | Facility: CLINIC | Age: 21
End: 2021-02-02

## 2021-02-02 DIAGNOSIS — Z20.822 CLOSE EXPOSURE TO COVID-19 VIRUS: Primary | ICD-10-CM

## 2021-02-02 DIAGNOSIS — F41.9 ANXIETY: ICD-10-CM

## 2021-02-02 DIAGNOSIS — G43.001 MIGRAINE WITHOUT AURA AND WITH STATUS MIGRAINOSUS, NOT INTRACTABLE: ICD-10-CM

## 2021-02-02 PROCEDURE — 99213 OFFICE O/P EST LOW 20 MIN: CPT | Performed by: INTERNAL MEDICINE

## 2021-02-02 RX ORDER — NORTRIPTYLINE HYDROCHLORIDE 50 MG/1
50 CAPSULE ORAL
Qty: 30 CAPSULE | Refills: 0 | Status: SHIPPED | OUTPATIENT
Start: 2021-02-02 | End: 2021-02-02 | Stop reason: SDUPTHER

## 2021-02-02 RX ORDER — ALBUTEROL SULFATE 90 UG/1
2 AEROSOL, METERED RESPIRATORY (INHALATION) EVERY 6 HOURS PRN
Qty: 18 G | Refills: 4 | Status: SHIPPED | OUTPATIENT
Start: 2021-02-02

## 2021-02-02 RX ORDER — NORTRIPTYLINE HYDROCHLORIDE 50 MG/1
50 CAPSULE ORAL
Qty: 30 CAPSULE | Refills: 3 | Status: SHIPPED | OUTPATIENT
Start: 2021-02-02 | End: 2022-03-01 | Stop reason: SDUPTHER

## 2021-02-02 RX ORDER — LEVOCETIRIZINE DIHYDROCHLORIDE 5 MG/1
5 TABLET, FILM COATED ORAL EVERY EVENING
Qty: 90 TABLET | Refills: 1 | Status: SHIPPED | OUTPATIENT
Start: 2021-02-02 | End: 2022-03-01 | Stop reason: SDUPTHER

## 2021-02-02 RX ORDER — ESCITALOPRAM OXALATE 10 MG/1
10 TABLET ORAL DAILY
Qty: 90 TABLET | Refills: 1 | Status: SHIPPED | OUTPATIENT
Start: 2021-02-02 | End: 2022-03-01 | Stop reason: SDUPTHER

## 2021-02-02 RX ORDER — RIZATRIPTAN BENZOATE 10 MG/1
10 TABLET ORAL AS NEEDED
Qty: 8 TABLET | Refills: 2 | Status: SHIPPED | OUTPATIENT
Start: 2021-02-02

## 2021-02-02 NOTE — PROGRESS NOTES
Subjective   Gloria Pina is a 20 y.o. female.     History of Present Illness     The following portions of the patient's history were reviewed and updated as appropriate: allergies, current medications, past family history, past medical history, past social history, past surgical history and problem list.    Patient has consented for video MyChart    Patient says that her boyfriend tested facility for COVID-19.  Patient says that he tested positive approximately 48 hours ago.  Currently, patient says that she has congestion, mild cough, and has noticed increase usage of her albuterol nebulizer.  No fever, no chills, no nausea, no vomiting, no diarrhea, no dyspnea on exertion, no rash, no other systemic symptoms.    Review of Systems   All other systems reviewed and are negative.      Objective   Physical Exam  Vitals signs and nursing note reviewed.   Constitutional:       Appearance: Normal appearance. She is normal weight.   HENT:      Head: Normocephalic.      Right Ear: Tympanic membrane, ear canal and external ear normal.      Left Ear: Tympanic membrane, ear canal and external ear normal.      Nose: Nose normal.      Mouth/Throat:      Mouth: Mucous membranes are moist.   Eyes:      Pupils: Pupils are equal, round, and reactive to light.   Neck:      Musculoskeletal: Normal range of motion and neck supple.   Cardiovascular:      Rate and Rhythm: Normal rate.      Pulses: Normal pulses.   Pulmonary:      Effort: Pulmonary effort is normal.      Breath sounds: Normal breath sounds.   Skin:     General: Skin is warm.      Capillary Refill: Capillary refill takes less than 2 seconds.   Neurological:      Mental Status: She is alert.           Assessment/Plan   Diagnoses and all orders for this visit:    1. Close exposure to COVID-19 virus (Primary)-patient is waiting for COVID-19 test results but currently in home quarantine    2. Migraine without aura and with status migrainosus, not intractable  -      rizatriptan (MAXALT) 10 MG tablet; Take 1 tablet by mouth As Needed for Migraine (take 1 at onset of migraine. Repeat every 2 hrs prn. Max. of 3 in 24 hours) for up to 1 dose.  Dispense: 8 tablet; Refill: 2  -     Discontinue: nortriptyline (PAMELOR) 50 MG capsule; Take 1 capsule by mouth every night at bedtime.  Dispense: 30 capsule; Refill: 0  -     nortriptyline (PAMELOR) 50 MG capsule; Take 1 capsule by mouth every night at bedtime.  Dispense: 30 capsule; Refill: 3    3. Anxiety  -     escitalopram (LEXAPRO) 10 MG tablet; Take 1 tablet by mouth Daily.  Dispense: 90 tablet; Refill: 1    Other orders  -     levocetirizine (XYZAL) 5 MG tablet; Take 1 tablet by mouth Every Evening.  Dispense: 90 tablet; Refill: 1

## 2021-06-28 ENCOUNTER — OFFICE VISIT (OUTPATIENT)
Dept: ORTHOPEDIC SURGERY | Facility: CLINIC | Age: 21
End: 2021-06-28

## 2021-06-28 VITALS
HEART RATE: 105 BPM | HEIGHT: 62 IN | DIASTOLIC BLOOD PRESSURE: 82 MMHG | BODY MASS INDEX: 25.19 KG/M2 | WEIGHT: 136.91 LBS | SYSTOLIC BLOOD PRESSURE: 139 MMHG

## 2021-06-28 DIAGNOSIS — M25.561 ACUTE PAIN OF RIGHT KNEE: Primary | ICD-10-CM

## 2021-06-28 PROCEDURE — 99214 OFFICE O/P EST MOD 30 MIN: CPT | Performed by: ORTHOPAEDIC SURGERY

## 2021-06-28 NOTE — PROGRESS NOTES
"      [facility] [place] [facility] [place]    Subjective     CC: Pain of the Right Knee      HPI    Gloria Pina is a 20 y.o. female who presents with new problem of: right knee pain.  Onset: mechanical fall. The issue has been ongoing for 2 week(s). Pain is a 4/10 on the pain scale. Pain is described as aching, throbbing and shooting. Associated symptoms include pain, swelling and grinding. The pain is worse with leisure; resting, ice, heat and pain medication and/or NSAID improve the pain. Previous treatments have included: bracing and NSAIDS.    I have reviewed the following portions of the patient's history:History of Present Illness and review of systems.    Her dog tripped her up and injured her right knee 2 weeks ago.  Her knee feels unstable.  She has been in a hinged knee brace without sufficient support.  She is working a seated job only right now.  Review of Systems   Constitutional: Negative.  Negative for chills, fatigue and fever.   HENT: Negative.  Negative for congestion and dental problem.    Eyes: Negative.  Negative for blurred vision.   Respiratory: Negative.  Negative for shortness of breath.    Cardiovascular: Negative.  Negative for leg swelling.   Gastrointestinal: Negative.  Negative for abdominal pain.   Endocrine: Negative.  Negative for polyuria.   Genitourinary: Negative.  Negative for difficulty urinating.   Musculoskeletal: Positive for arthralgias.   Skin: Negative.    Allergic/Immunologic: Negative.    Neurological: Negative.    Hematological: Negative.  Negative for adenopathy.   Psychiatric/Behavioral: Negative.  Negative for behavioral problems.       ROS:    Constiutional:Pt denies fever, chills, nausea, or vomiting.  MSK:as above      Objective      Past Medical History  Past Medical History:   Diagnosis Date   • Allergic    • Anxiety    • Asthma    • Endometriosis    • Migraine with Aura          Physical Exam  /82   Pulse 105   Ht 157.5 cm (62.01\")   Wt 62.1 kg " (136 lb 14.5 oz)   BMI 25.03 kg/m²     Body mass index is 25.03 kg/m².    Patient is well nourished and well developed.        Ortho Exam  Trace effusion.  Positive Lachman.  Stable varus valgus.  Limited motion    Imaging/Labs/EMG Reviewed:  Imaging Results (Last 24 Hours)     ** No results found for the last 24 hours. **      I viewed her x-rays from June 14 which are negative    Assessment:  1. Acute pain of right knee        Plan:  1. I ordered an MRI.  She most likely has an ACL tear.  2. T ROM brace  3. Rest, ice, and elevate.  4. Work restrictions are seated job only.    Follow Up:   Return for After MRI.      Medical Decision Making  Management Options : Low - OTC Drugs and Moderate - 1 Undiagnosed New Problem with Uncertain Prognosis        Srinivasa Toledo M.D., FAAOS  Orthopedic Surgeon  Fellowship Trained Sports Medicine  [facility]  Orthopedics and Sports Medicine  [address] [place] [address], [address]  [place] [address]

## 2021-07-08 DIAGNOSIS — M25.561 ACUTE PAIN OF RIGHT KNEE: ICD-10-CM

## 2021-07-19 ENCOUNTER — OFFICE VISIT (OUTPATIENT)
Dept: ORTHOPEDIC SURGERY | Facility: CLINIC | Age: 21
End: 2021-07-19

## 2021-07-19 VITALS
HEIGHT: 62 IN | DIASTOLIC BLOOD PRESSURE: 83 MMHG | WEIGHT: 136.91 LBS | SYSTOLIC BLOOD PRESSURE: 136 MMHG | BODY MASS INDEX: 25.19 KG/M2 | HEART RATE: 91 BPM

## 2021-07-19 DIAGNOSIS — M25.561 ACUTE PAIN OF RIGHT KNEE: Primary | ICD-10-CM

## 2021-07-19 PROCEDURE — 99213 OFFICE O/P EST LOW 20 MIN: CPT | Performed by: ORTHOPAEDIC SURGERY

## 2021-07-19 NOTE — PROGRESS NOTES
"      [facility] [place] [facility] [place]    Subjective     CC: Follow-up (Right knee MRI follow up. MRI done on 7-7-21)      SHERRELL Pina is a 20 y.o. female.  He is follow-up right knee MRI.  She says her knee feels the same.  She is in cosmetology school    Review of Systems   Constitutional: Negative.  Negative for chills, fatigue and fever.   HENT: Negative.  Negative for congestion and dental problem.    Eyes: Negative.  Negative for blurred vision.   Respiratory: Negative.  Negative for shortness of breath.    Cardiovascular: Negative.  Negative for leg swelling.   Gastrointestinal: Negative.  Negative for abdominal pain.   Endocrine: Negative.  Negative for polyuria.   Genitourinary: Negative.  Negative for difficulty urinating.   Musculoskeletal: Positive for arthralgias.   Skin: Negative.    Allergic/Immunologic: Negative.    Neurological: Negative.    Hematological: Negative.  Negative for adenopathy.   Psychiatric/Behavioral: Negative.  Negative for behavioral problems.       ROS:    Constiutional:Pt denies fever, chills, nausea, or vomiting.  MSK:as above      Objective      Past Medical History  Past Medical History:   Diagnosis Date   • Allergic    • Anxiety    • Asthma    • Endometriosis    • Migraine with Aura          Physical Exam  /83   Pulse 91   Ht 157.5 cm (62.01\")   Wt 62.1 kg (136 lb 14.5 oz)   BMI 25.03 kg/m²     Body mass index is 25.03 kg/m².    Patient is well nourished and well developed.        Ortho Exam  Right knee with full motion of firm endpoint on Lachman.  No instability    Imaging/Labs/EMG Reviewed:  Imaging Results (Last 24 Hours)     ** No results found for the last 24 hours. **      I viewed her MRI from July 7 which was normal    Assessment:  1. Acute pain of right knee        Plan:  1. Recommend over the counter anti-inflammatories for pain and/or swelling  2. She has a right knee sprain.  She was given a hinged knee brace today.  3. Physical therapy " at [facility]    Follow Up:   Return in about 1 month (around 8/19/2021).      Medical Decision Making  Management Options : Low - OT or PT Therapy  and 1 of 2 Categories = Category 1 - Review of Tests and Documents Category 2 - Assessment requiring an independent interpretation of tests         Srinivasa Toledo M.D., FAAOS  Orthopedic Surgeon  Fellowship Trained Sports Medicine    Orthopedics and Sports Medicine  [address] [place] [address], [address]  [place] [address]

## 2021-08-20 ENCOUNTER — TELEPHONE (OUTPATIENT)
Dept: ORTHOPEDIC SURGERY | Facility: CLINIC | Age: 21
End: 2021-08-20

## 2022-02-25 ENCOUNTER — LAB (OUTPATIENT)
Dept: LAB | Facility: HOSPITAL | Age: 22
End: 2022-02-25

## 2022-02-25 ENCOUNTER — OFFICE VISIT (OUTPATIENT)
Dept: INTERNAL MEDICINE | Facility: CLINIC | Age: 22
End: 2022-02-25

## 2022-02-25 VITALS
TEMPERATURE: 98.2 F | BODY MASS INDEX: 28.34 KG/M2 | SYSTOLIC BLOOD PRESSURE: 110 MMHG | WEIGHT: 154 LBS | OXYGEN SATURATION: 98 % | HEIGHT: 62 IN | DIASTOLIC BLOOD PRESSURE: 72 MMHG | HEART RATE: 104 BPM

## 2022-02-25 DIAGNOSIS — Z00.00 WELL ADULT EXAM: ICD-10-CM

## 2022-02-25 DIAGNOSIS — Z13.220 LIPID SCREENING: ICD-10-CM

## 2022-02-25 DIAGNOSIS — G43.001 MIGRAINE WITHOUT AURA AND WITH STATUS MIGRAINOSUS, NOT INTRACTABLE: ICD-10-CM

## 2022-02-25 DIAGNOSIS — Z00.129 ENCOUNTER FOR ROUTINE CHILD HEALTH EXAMINATION WITHOUT ABNORMAL FINDINGS: Primary | ICD-10-CM

## 2022-02-25 LAB
DEPRECATED RDW RBC AUTO: 36 FL (ref 37–54)
ERYTHROCYTE [DISTWIDTH] IN BLOOD BY AUTOMATED COUNT: 11.6 % (ref 12.3–15.4)
HCT VFR BLD AUTO: 42.3 % (ref 34–46.6)
HGB BLD-MCNC: 14.7 G/DL (ref 12–15.9)
MCH RBC QN AUTO: 30.1 PG (ref 26.6–33)
MCHC RBC AUTO-ENTMCNC: 34.8 G/DL (ref 31.5–35.7)
MCV RBC AUTO: 86.5 FL (ref 79–97)
PLATELET # BLD AUTO: 282 10*3/MM3 (ref 140–450)
PMV BLD AUTO: 10.7 FL (ref 6–12)
RBC # BLD AUTO: 4.89 10*6/MM3 (ref 3.77–5.28)
WBC NRBC COR # BLD: 7.93 10*3/MM3 (ref 3.4–10.8)

## 2022-02-25 PROCEDURE — 99395 PREV VISIT EST AGE 18-39: CPT | Performed by: INTERNAL MEDICINE

## 2022-02-25 PROCEDURE — 84439 ASSAY OF FREE THYROXINE: CPT | Performed by: INTERNAL MEDICINE

## 2022-02-25 PROCEDURE — 80061 LIPID PANEL: CPT | Performed by: INTERNAL MEDICINE

## 2022-02-25 PROCEDURE — 80050 GENERAL HEALTH PANEL: CPT | Performed by: INTERNAL MEDICINE

## 2022-02-25 RX ORDER — ERENUMAB-AOOE 70 MG/ML
70 INJECTION SUBCUTANEOUS ONCE
Qty: 1 ML | Refills: 0 | Status: SHIPPED | OUTPATIENT
Start: 2022-02-25 | End: 2022-02-25

## 2022-02-25 NOTE — PROGRESS NOTES
Chief Complaint  Annual Exam    Subjective    Gloria Pina is a 21 y.o. female.     Gloria Pina presents to [facility] for complete adult physical and annual examination.      History of Present Illness  1. Migraines - The patient reports she has been experiencing migraines again, for about 6 months. She states that she gets them more on the right side than the left side. She reports she still has symptoms of light sensitivity, noise sensitivity and nausea. The patient is taking Maxalt and she reports that it does help with her migraine breakthroughs, but she falls asleep shortly after taking it. She states that also takes either Excedrin or Tylenol extra strength when she has a breakthrough. She was previously taking amitriptyline daily.     2. Gynecological -  The patient reports she did not have a pap smear last year, but she is scheduled for one in 04/2022. She states that her periods are irregular and have been for some time. She denies any prolonged bleeding. She reports she has been on a form of birth control for 10 years. She states that she had the Nexplanon birth control implant removed approximately 1 year ago.     The patient reports she has received the COVD-19 vaccine. She reports that her most recent eye examination 1-2 months ago. She exercises 3 days a week, for the last 3 years. She is also taking Xyzal and Lexapro.      The following portions of the patient's history were reviewed and updated as appropriate: allergies, current medications, past family history, past medical history, past social history, past surgical history and problem list.        Complete Adult Physical          Diet: Regular        Exercise: At least 3 times a week        Social History: No cigarette smoking, no EtOH      Past Medical history    Migraine headache  amitryptyline   Birth control  Nortryptolin  Exedrin migrainge  maxalt         Preventative  "Screenings        Immunizations: COVID-19 vaccine          Review of Systems   All other systems reviewed and are negative.    A review of systems was performed, and pertinent findings are noted in the HPI.      Objective   Vital Signs:   /72 (BP Location: Right arm, Patient Position: Sitting, Cuff Size: Adult)   Pulse 104   Temp 98.2 °F (36.8 °C)   Ht 157.5 cm (62\")   Wt 69.9 kg (154 lb)   SpO2 98%   BMI 28.17 kg/m²     Body mass index is 28.17 kg/m².    Physical Exam  Constitutional:       General: She is not in acute distress.  HENT:      Head: Normocephalic and atraumatic.      Right Ear: Tympanic membrane normal.      Left Ear: Tympanic membrane normal.      Mouth/Throat:      Mouth: Mucous membranes are moist.   Eyes:      Extraocular Movements: Extraocular movements intact.      Pupils: Pupils are equal, round, and reactive to light.   Cardiovascular:      Heart sounds: S1 normal and S2 normal. No murmur heard.  No friction rub. No gallop.    Pulmonary:      Breath sounds: Normal breath sounds. No wheezing or rhonchi.   Abdominal:      General: Bowel sounds are normal.      Palpations: Abdomen is soft. There is no mass.      Tenderness: There is no abdominal tenderness.   Musculoskeletal:      Cervical back: Neck supple.   Lymphadenopathy:      Cervical: No cervical adenopathy.   Neurological:      Mental Status: She is alert and oriented to person, place, and time.      Cranial Nerves: No cranial nerve deficit.      Deep Tendon Reflexes:      Reflex Scores:       Tricep reflexes are 2+ on the right side and 2+ on the left side.       Bicep reflexes are 2+ on the right side and 2+ on the left side.       Brachioradialis reflexes are 2+ on the right side and 2+ on the left side.       Patellar reflexes are 2+ on the right side and 2+ on the left side.       Achilles reflexes are 2+ on the right side and 2+ on the left side.              Assessment and Plan  Diagnoses and all orders for this " visit:  1. Encounter for routine child health examination without abnormal findings  I strongly recommend a routine ophthalmology visit and to continue with regular dental screenings.    2. Migraine without aura and with status migrainosus, not intractable  After reviewing current management, I recommend maybe a trial of the Aimovig, which is a prophylactic medication to help with the patient's migraines. She is to continue the nortriptyline as scheduled, and then for breakthrough Maxalt. We will order labs for CBC, CMP, TSH, free T4, and lipid profile per today's visit.   - Erenumab-aooe (Aimovig) 70 MG/ML prefilled syringe; Inject 1 mL under the skin into the appropriate area as directed 1 (One) Time for 1 dose.  Dispense: 1 mL; Refill: 0    3. Lipid screening  - Lipid Panel; Future  - Lipid Panel    4. Well adult exam  - CBC (No Diff); Future  - Comprehensive Metabolic Panel; Future  - T4, Free; Future  - TSH; Future  - CBC (No Diff)  - Comprehensive Metabolic Panel  - T4, Free  - TSH      Follow up in clinic in 3 months, as needed.          Follow Up   No follow-ups on file.  Patient was given instructions and counseling regarding her condition or for health maintenance advice. Please see specific information pulled into the AVS if appropriate.      Transcribed from ambient dictation for Bishop Schmid MD by Melody Aponte.  02/26/22   12:24 EST    Patient verbalized consent to the visit recording.  I have personally performed the services described in this document as transcribed by the above individual, and it is both accurate and complete.  Bishop Schmid MD  2/26/2022  19:25 EST

## 2022-02-26 LAB
ALBUMIN SERPL-MCNC: 4.5 G/DL (ref 3.5–5.2)
ALBUMIN/GLOB SERPL: 1.7 G/DL
ALP SERPL-CCNC: 75 U/L (ref 39–117)
ALT SERPL W P-5'-P-CCNC: 19 U/L (ref 1–33)
ANION GAP SERPL CALCULATED.3IONS-SCNC: 11 MMOL/L (ref 5–15)
AST SERPL-CCNC: 17 U/L (ref 1–32)
BILIRUB SERPL-MCNC: 0.5 MG/DL (ref 0–1.2)
BUN SERPL-MCNC: 7 MG/DL (ref 6–20)
BUN/CREAT SERPL: 8.9 (ref 7–25)
CALCIUM SPEC-SCNC: 9.2 MG/DL (ref 8.6–10.5)
CHLORIDE SERPL-SCNC: 105 MMOL/L (ref 98–107)
CHOLEST SERPL-MCNC: 147 MG/DL (ref 0–200)
CO2 SERPL-SCNC: 23 MMOL/L (ref 22–29)
CREAT SERPL-MCNC: 0.79 MG/DL (ref 0.57–1)
GFR SERPL CREATININE-BSD FRML MDRD: 90 ML/MIN/1.73
GLOBULIN UR ELPH-MCNC: 2.7 GM/DL
GLUCOSE SERPL-MCNC: 96 MG/DL (ref 65–99)
HDLC SERPL-MCNC: 40 MG/DL (ref 40–60)
LDLC SERPL CALC-MCNC: 98 MG/DL (ref 0–100)
LDLC/HDLC SERPL: 2.48 {RATIO}
POTASSIUM SERPL-SCNC: 4.1 MMOL/L (ref 3.5–5.2)
PROT SERPL-MCNC: 7.2 G/DL (ref 6–8.5)
SODIUM SERPL-SCNC: 139 MMOL/L (ref 136–145)
T4 FREE SERPL-MCNC: 1.15 NG/DL (ref 0.93–1.7)
TRIGL SERPL-MCNC: 40 MG/DL (ref 0–150)
TSH SERPL DL<=0.05 MIU/L-ACNC: 2.28 UIU/ML (ref 0.27–4.2)
VLDLC SERPL-MCNC: 9 MG/DL (ref 5–40)

## 2022-03-01 DIAGNOSIS — F41.9 ANXIETY: ICD-10-CM

## 2022-03-01 DIAGNOSIS — G43.001 MIGRAINE WITHOUT AURA AND WITH STATUS MIGRAINOSUS, NOT INTRACTABLE: ICD-10-CM

## 2022-03-01 NOTE — TELEPHONE ENCOUNTER
Caller: Gloria Pina    Relationship: Self    Best call back number:     Requested Prescriptions:   Requested Prescriptions     Pending Prescriptions Disp Refills   • levocetirizine (XYZAL) 5 MG tablet 90 tablet 1     Sig: Take 1 tablet by mouth Every Evening.   • nortriptyline (PAMELOR) 50 MG capsule 30 capsule 3     Sig: Take 1 capsule by mouth every night at bedtime.   • escitalopram (LEXAPRO) 10 MG tablet 90 tablet 1     Sig: Take 1 tablet by mouth Daily.        Pharmacy where request should be sent: [facility] #[id number] - [place], [place] - [address] [place] [address] - [phone]  - [phone] FX     Additional details provided by patient: PATIENT ALSO STATES THE PHARMACY NEEDS AN AUTHORIZATION FOR THE AMOVIG AND THEY HAVE SENT A FORM TO DR CROOK  Does the patient have less than a 3 day supply:  [x] Yes  [] No    Candace Fine Rep   03/01/22 12:44 EST

## 2022-03-02 RX ORDER — LEVOCETIRIZINE DIHYDROCHLORIDE 5 MG/1
5 TABLET, FILM COATED ORAL EVERY EVENING
Qty: 90 TABLET | Refills: 1 | Status: SHIPPED | OUTPATIENT
Start: 2022-03-02 | End: 2022-08-31

## 2022-03-02 RX ORDER — NORTRIPTYLINE HYDROCHLORIDE 50 MG/1
50 CAPSULE ORAL
Qty: 30 CAPSULE | Refills: 3 | Status: SHIPPED | OUTPATIENT
Start: 2022-03-02

## 2022-03-02 RX ORDER — ESCITALOPRAM OXALATE 10 MG/1
10 TABLET ORAL DAILY
Qty: 90 TABLET | Refills: 1 | Status: SHIPPED | OUTPATIENT
Start: 2022-03-02 | End: 2022-08-31

## 2022-03-08 ENCOUNTER — TELEPHONE (OUTPATIENT)
Dept: INTERNAL MEDICINE | Facility: CLINIC | Age: 22
End: 2022-03-08

## 2022-03-09 ENCOUNTER — TELEPHONE (OUTPATIENT)
Dept: INTERNAL MEDICINE | Facility: CLINIC | Age: 22
End: 2022-03-09

## 2022-03-09 NOTE — TELEPHONE ENCOUNTER
Caller: Gloria Pina    Relationship: Self    Best call back number: [phone]    What form or medical record are you requesting: LETTER FOR AIMOVIG APPEAL     Who is requesting this form or medical record from you: PATIENT     How would you like to receive the form or medical records (pick-up, mail, fax): FAX  If fax, what is the fax number: [phone]  If mail, what is the address:   If pick-up, provide patient with address and location details    Timeframe paperwork needed: ASAP    Additional notes: PATIENT STATED THAT HER PRE-AUTHORIZATION FOR AIMOVIG HAS BEEN DECLINE  AND IS ASKING FOR A LETTER DESCRIBING WHY DR. CROOK FEELS LIKE THIS IS THE BEST COURSE OF ACTION

## 2022-03-09 NOTE — TELEPHONE ENCOUNTER
*Drug Not Covered/Plan Exclusion - Your request for coverage was denied because  your prescription benefit plan does not cover the requested medication. 03/09/2022  12:47 PM    Patient has been notified and verb good understanding. Patient will call her insurance to see what they will approve and call us back.

## 2022-03-11 NOTE — TELEPHONE ENCOUNTER
Letter has been written and we can submit this to the insurance company.  No guarantees but we can at least try to get approval    Please let patient know as an update

## 2022-03-15 ENCOUNTER — TELEPHONE (OUTPATIENT)
Dept: INTERNAL MEDICINE | Facility: CLINIC | Age: 22
End: 2022-03-15

## 2022-03-15 NOTE — TELEPHONE ENCOUNTER
Tried to reach patient to answer a couple of questions regarding the appeal for the Prior Authorization on AIMOVIG. Left voicemail for her to return a call to the office, number provided.

## 2022-08-31 DIAGNOSIS — F41.9 ANXIETY: ICD-10-CM

## 2022-08-31 RX ORDER — ESCITALOPRAM OXALATE 10 MG/1
TABLET ORAL
Qty: 90 TABLET | Refills: 1 | Status: SHIPPED | OUTPATIENT
Start: 2022-08-31

## 2022-08-31 RX ORDER — LEVOCETIRIZINE DIHYDROCHLORIDE 5 MG/1
TABLET, FILM COATED ORAL
Qty: 90 TABLET | Refills: 1 | Status: SHIPPED | OUTPATIENT
Start: 2022-08-31

## 2022-11-02 DIAGNOSIS — J42 CHRONIC BRONCHITIS, UNSPECIFIED CHRONIC BRONCHITIS TYPE: Primary | ICD-10-CM

## 2022-11-26 ENCOUNTER — APPOINTMENT (OUTPATIENT)
Dept: CT IMAGING | Facility: HOSPITAL | Age: 22
End: 2022-11-26

## 2022-11-26 LAB
ALBUMIN SERPL-MCNC: 4.1 G/DL (ref 3.5–5.2)
ALBUMIN/GLOB SERPL: 1.3 G/DL
ALP SERPL-CCNC: 90 U/L (ref 39–117)
ALT SERPL W P-5'-P-CCNC: 18 U/L (ref 1–33)
ANION GAP SERPL CALCULATED.3IONS-SCNC: 13 MMOL/L (ref 5–15)
AST SERPL-CCNC: 19 U/L (ref 1–32)
B-HCG UR QL: NEGATIVE
BASOPHILS # BLD AUTO: 0.03 10*3/MM3 (ref 0–0.2)
BASOPHILS NFR BLD AUTO: 0.3 % (ref 0–1.5)
BILIRUB SERPL-MCNC: 0.6 MG/DL (ref 0–1.2)
BUN SERPL-MCNC: 9 MG/DL (ref 6–20)
BUN/CREAT SERPL: 11.8 (ref 7–25)
CALCIUM SPEC-SCNC: 8.7 MG/DL (ref 8.6–10.5)
CHLORIDE SERPL-SCNC: 103 MMOL/L (ref 98–107)
CO2 SERPL-SCNC: 22 MMOL/L (ref 22–29)
CREAT SERPL-MCNC: 0.76 MG/DL (ref 0.57–1)
DEPRECATED RDW RBC AUTO: 37.5 FL (ref 37–54)
EGFRCR SERPLBLD CKD-EPI 2021: 112.4 ML/MIN/1.73
EOSINOPHIL # BLD AUTO: 0.01 10*3/MM3 (ref 0–0.4)
EOSINOPHIL NFR BLD AUTO: 0.1 % (ref 0.3–6.2)
ERYTHROCYTE [DISTWIDTH] IN BLOOD BY AUTOMATED COUNT: 11.7 % (ref 12.3–15.4)
EXPIRATION DATE: NORMAL
GLOBULIN UR ELPH-MCNC: 3.1 GM/DL
GLUCOSE SERPL-MCNC: 105 MG/DL (ref 65–99)
HCT VFR BLD AUTO: 41.5 % (ref 34–46.6)
HGB BLD-MCNC: 14.6 G/DL (ref 12–15.9)
HOLD SPECIMEN: NORMAL
HOLD SPECIMEN: NORMAL
IMM GRANULOCYTES # BLD AUTO: 0.03 10*3/MM3 (ref 0–0.05)
IMM GRANULOCYTES NFR BLD AUTO: 0.3 % (ref 0–0.5)
INTERNAL NEGATIVE CONTROL: NORMAL
INTERNAL POSITIVE CONTROL: NORMAL
LIPASE SERPL-CCNC: 21 U/L (ref 13–60)
LYMPHOCYTES # BLD AUTO: 0.62 10*3/MM3 (ref 0.7–3.1)
LYMPHOCYTES NFR BLD AUTO: 5.5 % (ref 19.6–45.3)
Lab: NORMAL
MCH RBC QN AUTO: 30.9 PG (ref 26.6–33)
MCHC RBC AUTO-ENTMCNC: 35.2 G/DL (ref 31.5–35.7)
MCV RBC AUTO: 87.7 FL (ref 79–97)
MONOCYTES # BLD AUTO: 0.39 10*3/MM3 (ref 0.1–0.9)
MONOCYTES NFR BLD AUTO: 3.5 % (ref 5–12)
NEUTROPHILS NFR BLD AUTO: 10.2 10*3/MM3 (ref 1.7–7)
NEUTROPHILS NFR BLD AUTO: 90.3 % (ref 42.7–76)
NRBC BLD AUTO-RTO: 0 /100 WBC (ref 0–0.2)
PLATELET # BLD AUTO: 279 10*3/MM3 (ref 140–450)
PMV BLD AUTO: 10.2 FL (ref 6–12)
POTASSIUM SERPL-SCNC: 3.9 MMOL/L (ref 3.5–5.2)
PROT SERPL-MCNC: 7.2 G/DL (ref 6–8.5)
RBC # BLD AUTO: 4.73 10*6/MM3 (ref 3.77–5.28)
SODIUM SERPL-SCNC: 138 MMOL/L (ref 136–145)
WBC NRBC COR # BLD: 11.28 10*3/MM3 (ref 3.4–10.8)
WHOLE BLOOD HOLD COAG: NORMAL
WHOLE BLOOD HOLD SPECIMEN: NORMAL

## 2022-11-26 PROCEDURE — 74177 CT ABD & PELVIS W/CONTRAST: CPT

## 2022-11-26 PROCEDURE — 96374 THER/PROPH/DIAG INJ IV PUSH: CPT

## 2022-11-26 PROCEDURE — 99283 EMERGENCY DEPT VISIT LOW MDM: CPT

## 2022-11-26 PROCEDURE — 80053 COMPREHEN METABOLIC PANEL: CPT | Performed by: EMERGENCY MEDICINE

## 2022-11-26 PROCEDURE — 85025 COMPLETE CBC W/AUTO DIFF WBC: CPT | Performed by: EMERGENCY MEDICINE

## 2022-11-26 PROCEDURE — 81025 URINE PREGNANCY TEST: CPT | Performed by: EMERGENCY MEDICINE

## 2022-11-26 PROCEDURE — 81001 URINALYSIS AUTO W/SCOPE: CPT | Performed by: EMERGENCY MEDICINE

## 2022-11-26 PROCEDURE — 83690 ASSAY OF LIPASE: CPT | Performed by: EMERGENCY MEDICINE

## 2022-11-26 PROCEDURE — 96375 TX/PRO/DX INJ NEW DRUG ADDON: CPT

## 2022-11-26 RX ORDER — ONDANSETRON 2 MG/ML
4 INJECTION INTRAMUSCULAR; INTRAVENOUS ONCE
Status: COMPLETED | OUTPATIENT
Start: 2022-11-26 | End: 2022-11-27

## 2022-11-26 RX ORDER — SODIUM CHLORIDE 9 MG/ML
10 INJECTION INTRAVENOUS AS NEEDED
Status: DISCONTINUED | OUTPATIENT
Start: 2022-11-26 | End: 2022-11-27 | Stop reason: HOSPADM

## 2022-11-26 RX ORDER — HYDROMORPHONE HYDROCHLORIDE 1 MG/ML
0.5 INJECTION, SOLUTION INTRAMUSCULAR; INTRAVENOUS; SUBCUTANEOUS ONCE
Status: COMPLETED | OUTPATIENT
Start: 2022-11-26 | End: 2022-11-27

## 2022-11-27 ENCOUNTER — HOSPITAL ENCOUNTER (EMERGENCY)
Facility: HOSPITAL | Age: 22
Discharge: HOME | End: 2022-11-27
Attending: EMERGENCY MEDICINE | Admitting: EMERGENCY MEDICINE

## 2022-11-27 VITALS
HEIGHT: 62 IN | HEART RATE: 108 BPM | BODY MASS INDEX: 28.52 KG/M2 | TEMPERATURE: 98.5 F | DIASTOLIC BLOOD PRESSURE: 67 MMHG | OXYGEN SATURATION: 98 % | WEIGHT: 155 LBS | SYSTOLIC BLOOD PRESSURE: 113 MMHG | RESPIRATION RATE: 18 BRPM

## 2022-11-27 DIAGNOSIS — R10.32 LEFT LOWER QUADRANT ABDOMINAL PAIN: Primary | ICD-10-CM

## 2022-11-27 DIAGNOSIS — Z87.42 HISTORY OF ENDOMETRIOSIS: ICD-10-CM

## 2022-11-27 LAB
AMORPH URATE CRY URNS QL MICRO: ABNORMAL /HPF
BACTERIA UR QL AUTO: ABNORMAL /HPF
BILIRUB UR QL STRIP: NEGATIVE
CLARITY UR: ABNORMAL
COLOR UR: YELLOW
GLUCOSE UR STRIP-MCNC: NEGATIVE MG/DL
HGB UR QL STRIP.AUTO: ABNORMAL
HOLD SPECIMEN: NORMAL
HYALINE CASTS UR QL AUTO: ABNORMAL /LPF
KETONES UR QL STRIP: ABNORMAL
LEUKOCYTE ESTERASE UR QL STRIP.AUTO: ABNORMAL
NITRITE UR QL STRIP: NEGATIVE
PH UR STRIP.AUTO: 5.5 [PH] (ref 5–8)
PROT UR QL STRIP: ABNORMAL
RBC # UR STRIP: ABNORMAL /HPF
REF LAB TEST METHOD: ABNORMAL
SP GR UR STRIP: >=1.03 (ref 1–1.03)
SQUAMOUS #/AREA URNS HPF: ABNORMAL /HPF
UROBILINOGEN UR QL STRIP: ABNORMAL
WBC # UR STRIP: ABNORMAL /HPF

## 2022-11-27 PROCEDURE — 25010000002 ONDANSETRON PER 1 MG: Performed by: EMERGENCY MEDICINE

## 2022-11-27 PROCEDURE — 25010000002 KETOROLAC TROMETHAMINE PER 15 MG: Performed by: EMERGENCY MEDICINE

## 2022-11-27 PROCEDURE — 96375 TX/PRO/DX INJ NEW DRUG ADDON: CPT

## 2022-11-27 PROCEDURE — 25010000002 HYDROMORPHONE PER 4 MG: Performed by: EMERGENCY MEDICINE

## 2022-11-27 PROCEDURE — 25010000002 IOPAMIDOL 61 % SOLUTION: Performed by: EMERGENCY MEDICINE

## 2022-11-27 PROCEDURE — 96374 THER/PROPH/DIAG INJ IV PUSH: CPT

## 2022-11-27 RX ORDER — HYDROCODONE BITARTRATE AND ACETAMINOPHEN 5; 325 MG/1; MG/1
1 TABLET ORAL EVERY 6 HOURS PRN
Qty: 12 TABLET | Refills: 0 | Status: SHIPPED | OUTPATIENT
Start: 2022-11-27

## 2022-11-27 RX ORDER — KETOROLAC TROMETHAMINE 15 MG/ML
15 INJECTION, SOLUTION INTRAMUSCULAR; INTRAVENOUS ONCE
Status: COMPLETED | OUTPATIENT
Start: 2022-11-27 | End: 2022-11-27

## 2022-11-27 RX ORDER — ONDANSETRON 4 MG/1
4 TABLET, FILM COATED ORAL EVERY 8 HOURS PRN
Qty: 15 TABLET | Refills: 0 | Status: SHIPPED | OUTPATIENT
Start: 2022-11-27

## 2022-11-27 RX ADMIN — IOPAMIDOL 100 ML: 612 INJECTION, SOLUTION INTRAVENOUS at 00:01

## 2022-11-27 RX ADMIN — SODIUM CHLORIDE 1000 ML: 9 INJECTION, SOLUTION INTRAVENOUS at 00:48

## 2022-11-27 RX ADMIN — HYDROMORPHONE HYDROCHLORIDE 0.5 MG: 1 INJECTION, SOLUTION INTRAMUSCULAR; INTRAVENOUS; SUBCUTANEOUS at 00:49

## 2022-11-27 RX ADMIN — KETOROLAC TROMETHAMINE 15 MG: 15 INJECTION, SOLUTION INTRAMUSCULAR; INTRAVENOUS at 01:44

## 2022-11-27 RX ADMIN — ONDANSETRON 4 MG: 2 INJECTION INTRAMUSCULAR; INTRAVENOUS at 00:49

## 2023-08-09 RX ORDER — LEVOCETIRIZINE DIHYDROCHLORIDE 5 MG/1
TABLET, FILM COATED ORAL
Qty: 90 TABLET | Refills: 1 | Status: SHIPPED | OUTPATIENT
Start: 2023-08-09

## 2023-11-09 NOTE — PROGRESS NOTES
MARQUIS Boles  Gloria Pina  : 2000  MRN: [id number]  CSN: [id number]    History and Physical    Subjective   Gloria Pina is a 18 y.o. year old  who presents for laparoscopic ovarian cystectomy surgery due to persistent L ovarian cyst seen on ultrasound. She has no acute complaints.    Patient Active Problem List   Diagnosis   • Asthma   • Asthma exacerbation   • Costochondritis   • Seasonal allergies   • Tachypnea   • Warts     Past Medical History:   Diagnosis Date   • Allergic    • Anxiety    • Asthma    • Migraine    • Ovarian cyst      Past Surgical History:   Procedure Laterality Date   • APPENDECTOMY     • CHOLECYSTECTOMY       Social History     Social History   • Marital status: Single     Social History Main Topics   • Smoking status: Never Smoker   • Smokeless tobacco: Never Used   • Alcohol use No   • Drug use: No   • Sexual activity: Yes     Partners: Male     Birth control/ protection: Condom     Other Topics Concern   • Not on file       Current Outpatient Prescriptions:   •  acetaminophen (TYLENOL) 325 MG tablet, Take 650 mg by mouth Every 6 (Six) Hours As Needed for Mild Pain ., Disp: , Rfl:   •  acyclovir (ZOVIRAX) 800 MG tablet, Take 1 tablet by mouth 3 (Three) Times a Day. For 8 days treatment, Disp: 24 tablet, Rfl: 0  •  escitalopram (LEXAPRO) 10 MG tablet, Take 1 tablet by mouth Daily., Disp: 30 tablet, Rfl: 4  •  HYDROcodone-ibuprofen (VICOPROFEN) 7.5-200 MG per tablet, Take 1 tablet by mouth Every 8 (Eight) Hours As Needed for Moderate Pain ., Disp: 30 tablet, Rfl: 0  •  ibuprofen (ADVIL,MOTRIN) 800 MG tablet, Take 1 tablet by mouth 3 (Three) Times a Day With Meals., Disp: 21 tablet, Rfl: 0  •  levocetirizine (XYZAL) 5 MG tablet, TAKE 1 TABLET BY MOUTH EVERY EVENING., Disp: 30 tablet, Rfl: 5  •  nortriptyline (PAMELOR) 50 MG capsule, Take one tablet po qhs, Disp: 30 capsule, Rfl: 4  •  ondansetron (ZOFRAN) 4 MG tablet, Take 1 tablet by mouth Every 8 (Eight) Hours As  Needed for Nausea or Vomiting., Disp: 15 tablet, Rfl: 0  •  rizatriptan (MAXALT) 10 MG tablet, Take 1 tablet by mouth As Needed for Migraine (take 1 at onset of migraine. Repeat every 2 hrs prn. Max. of 3 in 24 hours) for up to 1 dose., Disp: 8 tablet, Rfl: 2    Allergies   Allergen Reactions   • Other Other (See Comments)     Pet dander (cats) eyes get dry and nose runs       Review of Systems      Objective   There were no vitals taken for this visit.  General: well developed; well nourished  appears stated age  mildly distressed   Heart: regular rate and rhythm, S1, S2 normal, no murmur, click, rub or gallop   Lungs: breathing is unlabored   Abdomen: soft, non-tender; no masses  no umbilical or inginual hernias are present  no hepato-splenomegaly   Pelvis:: Clinical staff was present for exam  External genitalia:  normal appearance of the external genitalia including [place]tholin's and [place]'s glands.  :  urethral meatus normal;  Vaginal:  normal pink mucosa without prolapse or lesions.  Cervix:  normal appearance.  Uterus:  normal size, shape and consistency.  Adnexa:  tenderness of the left adnexa to  superficial and deep palpation;   Labs  Lab Results   Component Value Date     08/03/2018    HGB 13.5 08/03/2018    HCT 38.9 08/03/2018    WBC 6.41 08/03/2018     08/03/2018    K 4.1 08/03/2018     08/03/2018    CO2 27.0 08/03/2018    BUN 10 08/03/2018    CREATININE 0.70 08/03/2018    GLUCOSE 96 08/03/2018    ALBUMIN 4.20 08/03/2018    CALCIUM 9.3 08/03/2018    AST 18 08/03/2018    ALT 25 08/03/2018    BILITOT 0.6 08/03/2018        Assessment   1. L ovarian cyst     Plan   1. Today I discussed with Gloria the risks of her upcoming surgical procedure. Risks including intraoperative bleeding, infection at the site of surgery, damage to the adjacent surrounding organs, catheter introduced urinary tract infections and the small risk for deep vein thrombosis were all explained. Additionally, the  small risk for reoperation in the event of unanticipated bleeding or surgical injury was discussed.  All of her questions were answered fully.  She left with a very clear understanding of the preoperative surgical indications and the nature of the surgery for which she is scheduled.  She understands to be NPO after midnight and to be at the preoperative area at least 1-1/2 hours prior to the scheduled surgical start time.    Hong Tucker MD MBA  8/15/2018  8:30 PM         negative...